# Patient Record
Sex: FEMALE | Race: WHITE | NOT HISPANIC OR LATINO | Employment: FULL TIME | URBAN - METROPOLITAN AREA
[De-identification: names, ages, dates, MRNs, and addresses within clinical notes are randomized per-mention and may not be internally consistent; named-entity substitution may affect disease eponyms.]

---

## 2017-10-21 ENCOUNTER — HOSPITAL ENCOUNTER (EMERGENCY)
Facility: CLINIC | Age: 42
Discharge: HOME | End: 2017-10-21
Attending: EMERGENCY MEDICINE | Admitting: EMERGENCY MEDICINE
Payer: COMMERCIAL

## 2017-10-21 VITALS
OXYGEN SATURATION: 98 % | TEMPERATURE: 98.8 F | RESPIRATION RATE: 14 BRPM | WEIGHT: 187 LBS | DIASTOLIC BLOOD PRESSURE: 89 MMHG | SYSTOLIC BLOOD PRESSURE: 137 MMHG | HEIGHT: 66 IN | BODY MASS INDEX: 30.05 KG/M2

## 2017-10-21 DIAGNOSIS — F41.9 ANXIETY: ICD-10-CM

## 2017-10-21 PROCEDURE — 99283 EMERGENCY DEPT VISIT LOW MDM: CPT

## 2017-10-21 PROCEDURE — 25000132 ZZH RX MED GY IP 250 OP 250 PS 637: Performed by: EMERGENCY MEDICINE

## 2017-10-21 RX ORDER — LORAZEPAM 2 MG/ML
0.5 INJECTION INTRAMUSCULAR ONCE
Status: DISCONTINUED | OUTPATIENT
Start: 2017-10-21 | End: 2017-10-21

## 2017-10-21 RX ORDER — LORAZEPAM 0.5 MG/1
1 TABLET ORAL ONCE
Status: COMPLETED | OUTPATIENT
Start: 2017-10-21 | End: 2017-10-21

## 2017-10-21 RX ADMIN — LORAZEPAM 1 MG: 0.5 TABLET ORAL at 03:48

## 2017-10-21 ASSESSMENT — ENCOUNTER SYMPTOMS
NUMBNESS: 1
SHORTNESS OF BREATH: 0
PALPITATIONS: 1
NERVOUS/ANXIOUS: 1

## 2017-10-21 NOTE — DISCHARGE INSTRUCTIONS

## 2017-10-21 NOTE — ED PROVIDER NOTES
"  History     Chief Complaint:  Panic Attack    HPI   Any Jackson is a 42 year old female who presents to the emergency department for evaluation of a possible panic attack. Of note, the patient states that she was seen in clinic for a routine visit earlier this month and was noted to have slightly elevated blood pressure without diagnosis of HTN. This elevated blood pressure has been a source of great anxiety for the patient and prompted her to again be seen in clinic on 10/16 for her anxiety, at which time she was given a prescription for Escitalopram. This evening, the patient states that she was resting comfortably watching TV, though began thinking about her blood pressure and \"had a panic attack,\" explaining that she felt very anxious, along with racing heart rate and generalized extremity tingling, at approximately 0030. This prompted her ED visit today. The patient has not had an episode of these symptoms in the past. She denies any recent chest pain or shortness of breath during this episode.     Allergies:  NKDA    Medications:    Lexapro  Celexa    Past Medical History:    Anemia  DM    Past Surgical History:    The patient does not have any pertinent past surgical history.    Family History:    No past pertinent family history.    Social History:  Presents with her .   Former Smoker.   Positive for alcohol use.   Marital Status:   [2]    Review of Systems   Respiratory: Negative for shortness of breath.    Cardiovascular: Positive for palpitations. Negative for chest pain.   Neurological: Positive for numbness (Tingling).   Psychiatric/Behavioral: The patient is nervous/anxious.    All other systems reviewed and are negative.    Physical Exam   First Vitals:  BP: (!) 175/96  Heart Rate: 121  Temp: 98.8  F (37.1  C)  Resp: 20  Height: 167.6 cm (5' 6\")  Weight: 84.8 kg (187 lb)  SpO2: 98 %    Physical Exam  General/Appearance: appears stated age, well-groomed, appears comfortable but " anxious and tearful  Eyes: EOMI, no scleral injection, no icterus  ENT: MMM  Neck: supple, nl ROM, no stiffness  Cardiovascular: tachy but regular, nl S1S2, no m/r/g, 2+ pulses in all 4 extremities, cap refill <2sec  Respiratory: CTAB, good air movement throughout, no wheezes/rhonchi/rales, no increased WOB, no retractions  Back: no lesions  GI: abd soft, non-distended, nttp,  no HSM, no rebound, no guarding, nl BS  MSK: WILSON, good tone, no bony abnormality  Skin: warm and well-perfused, no rash, no edema, no ecchymosis, nl turgor  Neuro: GCS 15, alert and oriented, no gross focal neuro deficits  Psych: anxious and tearful, good eye contact, no SI/HI  Heme: no petechia, no purpura, no active bleeding        Emergency Department Course   Interventions:  0348 Lorazepam, 1 mg, PO    Emergency Department Course:  Nursing notes and vitals reviewed. 0330 I performed an exam of the patient as documented above.     0430 I rechecked the patient. She is feeling improved after the above interventions.     Findings and plan explained to the Patient. Patient discharged home with instructions regarding supportive care, medications, and reasons to return. The importance of close follow-up was reviewed.     Impression & Plan    Medical Decision Making:  This patient is a 42-year-old female who presents with generalized symptoms very consistent with an anxiety reaction. There were no concerning symptoms such as chest pain, shortness of breath, presyncope, palpitations. Her blood pressure is slightly elevated here however she is tearful and anxious and therefore appropriate for situation. She feels better after Ativan. She recently was already started on Escitalopram by her PCP. She'll continue to follow up with them as needed.  Diagnosis:    ICD-10-CM   1. Anxiety F41.9       Disposition:  discharged to home  Daniel DURAN am serving as a scribe on 10/21/2017 at 3:30 AM to personally document services performed by Mehreen  Dottie PEREZ based on my observations and the provider's statements to me.       Daniel Mcgarry  10/21/2017    EMERGENCY DEPARTMENT       Dottie Verde MD  10/21/17 0652

## 2017-10-21 NOTE — ED AVS SNAPSHOT
Emergency Department    [address] [facility] [address]    Phone:  [phone]    Fax:  [phone]                                       Any Jackson   MRN: [id number]    Department:   Emergency Department   Date of Visit:  10/21/2017           Patient Information     Date Of Birth          1975        Your diagnoses for this visit were:     Anxiety        You were seen by Dottie Verde MD.      Follow-up Information     Follow up with Berlin Ontiveros MD.    Specialty:  OB/Gyn    Why:  As needed, If symptoms worsen    Contact information:    XXX RESIGNED XXX  [address]  [facility] [id number] [phone]          Follow up with  Emergency Department.    Specialty:  EMERGENCY MEDICINE    Why:  As needed    Contact information:    [address] [facility] [address] [phone]        Discharge Instructions         Anxiety Reaction  Anxiety is the feeling we all get when we think something bad might happen. It is a normal response to stress and usually causes only a mild reaction. When anxiety becomes more severe, it can interfere with daily life. In some cases, you may not even be aware of what it is you re anxious about. There may also be a genetic link or it may be a learned behavior in the home.  Both psychological and physical triggers cause stress reaction. It's often a response to fear or emotional stress, real or imagined. This stress may come from home, family, work, or social relationships.  During an anxiety reaction, you may feel:    Helpless    Nervous    Depressed    Irritable  Your body may show signs of anxiety in many ways. You may experience:    Dry mouth    Shakiness    Dizziness    Weakness    Trouble breathing    Breathing fast (hyperventilating)    Chest pressure    Sweating    Headache    Nausea    Diarrhea    Tiredness    Inability to sleep    Sexual problems  Home care    Try to locate the sources of stress in your life. They  may not be obvious. These may include:    Daily hassles of life (traffic jams, missed appointments, car troubles, etc.)    Major life changes, both good (new baby, job promotion) and bad (loss of job, loss of loved one)    Overload: feeling that you have too many responsibilities and can't take care of all of them at once    Feeling helpless, feeling that your problems are beyond what you re able to solve    Notice how your body reacts to stress. Learn to listen to your body signals. This will help you take action before the stress becomes severe.    When you can, do something about the source of your stress. (Avoid hassles, limit the amount of change that happens in your life at one time and take a break when you feel overloaded).    Unfortunately, many stressful situations can't be avoided. It is necessary to learn how to better manage stress. There are many proven methods that will reduce your anxiety. These include simple things like exercise, good nutrition and adequate rest. Also, there are certain techniques that are helpful:    Relaxation    Breathing exercises    Visualization    Biofeedback    Meditation  For more information about this, consult your doctor or go to a local bookstore and review the many books and tapes available on this subject.  Follow-up care  If you feel that your anxiety is not responding to self-help measures, contact your doctor or make an appointment with a counselor. You may need short-term psychological counseling and temporary medicine to help you manage stress.  Call 911  Call your healthcare provider right away if any of these occur:    Trouble breathing    Confusion    Drowsiness or trouble wakening    Fainting or loss of consciousness    Rapid heart rate    Seizure    New chest pain that becomes more severe, lasts longer, or spreads into your shoulder, arm, neck, jaw, or back  When to seek medical advice  Call your healthcare provider right away if any of these occur:    Your  symptoms get worse    Severe headache not relieved by rest and mild pain reliever  Date Last Reviewed: 9/29/2015 2000-2017 [employer]. [address] [place] [address], [place] [address]. All rights reserved. This information is not intended as a substitute for professional medical care. Always follow your healthcare professional's instructions.          24 Hour Appointment Hotline       To make an appointment at any [facility], call [phone] ([phone]). If you don't have a family doctor or clinic, we will help you find one. [facility] are conveniently located to serve the needs of you and your family.             Review of your medicines      Our records show that you are taking the medicines listed below. If these are incorrect, please call your family doctor or clinic.        Dose / Directions Last dose taken    CALCIUM PO   Dose:  1 tablet        Take 1 tablet by mouth daily.   Refills:  0        ESCITALOPRAM OXALATE PO   Dose:  10 mg        Take 10 mg by mouth   Refills:  0        ibuprofen 400-800 mg tablet   Commonly known as:  ADVIL,MOTRIN   Dose:  400-800 mg   Quantity:  90 tablet        Take 1-2 tablets by mouth every 6 hours as needed (cramping).   Refills:  0        PRENATAL VITAMINS PO   Dose:  1 tablet        Take 1 tablet by mouth daily.   Refills:  0        senna-docusate 8.6-50 MG per tablet   Commonly known as:  SENOKOT-S;PERICOLACE   Dose:  1-2 tablet   Quantity:  60 tablet        Take 1-2 tablets by mouth 2 times daily as needed for constipation.   Refills:  0                Orders Needing Specimen Collection     None      Pending Results     No orders found from 10/19/2017 to 10/22/2017.            Pending Culture Results     No orders found from 10/19/2017 to 10/22/2017.            Pending Results Instructions     If you had any lab results that were not finalized at the time of your Discharge, you can call the ED Lab Result RN at [phone]. You will be  contacted by this team for any positive Lab results or changes in treatment. The nurses are available 7 days a week from 10A to 6:30P.  You can leave a message 24 hours per day and they will return your call.        Test Results From Your Hospital Stay               Clinical Quality Measure: Blood Pressure Screening     Your blood pressure was checked while you were in the emergency department today. The last reading we obtained was  BP: 132/79 . Please read the guidelines below about what these numbers mean and what you should do about them.  If your systolic blood pressure (the top number) is less than 120 and your diastolic blood pressure (the bottom number) is less than 80, then your blood pressure is normal. There is nothing more that you need to do about it.  If your systolic blood pressure (the top number) is 120-139 or your diastolic blood pressure (the bottom number) is 80-89, your blood pressure may be higher than it should be. You should have your blood pressure rechecked within a year by a primary care provider.  If your systolic blood pressure (the top number) is 140 or greater or your diastolic blood pressure (the bottom number) is 90 or greater, you may have high blood pressure. High blood pressure is treatable, but if left untreated over time it can put you at risk for heart attack, stroke, or kidney failure. You should have your blood pressure rechecked by a primary care provider within the next 4 weeks.  If your provider in the emergency department today gave you specific instructions to follow-up with your doctor or provider even sooner than that, you should follow that instruction and not wait for up to 4 weeks for your follow-up visit.        Thank you for choosing [facility]       Thank you for choosing [facility] for your care. Our goal is always to provide you with excellent care. Hearing back from our patients is one way we can continue to improve our services. Please take a few minutes to  "complete the written survey that you may receive in the mail after you visit with us. Thank you!        [email or web address] Information     [employer] lets you send messages to your doctor, view your test results, renew your prescriptions, schedule appointments and more. To sign up, go to [email or web address] . Click on \"Log in\" on the left side of the screen, which will take you to the Welcome page. Then click on \"Sign up Now\" on the right side of the page.     You will be asked to enter the access code listed below, as well as some personal information. Please follow the directions to create your username and password.     Your access code is: [email or web address]  Expires: 2018  4:43 AM     Your access code will  in 90 days. If you need help or a new code, please call your [facility] clinic or [phone].        Care EveryWhere ID     This is your Care EveryWhere ID. This could be used by other organizations to access your [facility] medical records  [id number]        Equal Access to Services     [facility] Hospital[facility] : Hadii ismael mourao Sodexter, waaxda luqadaha, qaybta kaalmarebel escobar, basil holm . So [facility] [phone].    ATENCIÓN: Si habla español, tiene a emerosn disposición servicios gratuitos de asistencia lingüística. Llame al [phone].    We comply with applicable federal civil rights laws and [place] laws. We do not discriminate on the basis of race, color, national origin, age, disability, sex, sexual orientation, or gender identity.            After Visit Summary       This is your record. Keep this with you and show to your community pharmacist(s) and doctor(s) at your next visit.                  "

## 2017-10-21 NOTE — ED AVS SNAPSHOT
Emergency Department    [address] [facility] [address]    Phone:  [phone]    Fax:  [phone]                                       Any Jackson   MRN: [id number]    Department:   Emergency Department   Date of Visit:  10/21/2017           After Visit Summary Signature Page     I have received my discharge instructions, and my questions have been answered. I have discussed any challenges I see with this plan with the nurse or doctor.    ..........................................................................................................................................  Patient/Patient Representative Signature      ..........................................................................................................................................  Patient Representative Print Name and Relationship to Patient    ..................................................               ................................................  Date                                            Time    ..........................................................................................................................................  Reviewed by Signature/Title    ...................................................              ..............................................  Date                                                            Time

## 2017-10-25 ENCOUNTER — CARE COORDINATION (OUTPATIENT)
Dept: CARE COORDINATION | Facility: CLINIC | Age: 42
End: 2017-10-25

## 2017-10-25 NOTE — PROGRESS NOTES
Clinic Care Coordination Contact  [facility]/Voicemail    Referral Source: ED Follow-Up  Clinical Data: Care Coordinator Outreach Patient went to ED for panic attack.  She has increased symptoms of anxiety due to recent change in her health, high blood pressure.    Outreach attempted x 1.  Left message on voicemail with call back information and requested return call.  Plan: Care Coordinator will try to reach patient again in 1-2 business days.  Olga Perez,   [facility]  [email or web address]  [phone]

## 2017-10-25 NOTE — LETTER
[facility]  [address]  [place] [address]  [phone]      October 30, 2017      Any Jackson  [address]  [facility] [address]    Dear Any,  I am the Clinic Care Coordinator that works with your primary care provider's clinic. I wanted to introduce myself and provide you with my contact information for you to be able to call me with any questions or concerns. Below is a description of what Clinic Care Coordination is and how I can further assist you.      The Clinic Care Coordinator role is a Registered Nurse and/or  who understands the health care system. The goal of Clinic Care Coordination is to help you manage your health and improve access to the [facility] system in the most efficient manner.  The Registered Nurse can assist you in meeting your health care goals by providing education, coordinating services, and strengthening the communication among your providers. The  can assist you with financial, behavioral, psychosocial, and chemical dependency and counseling/psychiatric resources.    Please feel free to keep this letter and contact information to contact me at [phone] with any further questions or concerns that may arise. We at [facility] are focused on providing you with the highest-quality healthcare experience possible and that all starts with you.       Sincerely,     SHAN Wheeler  [phone]      Enclosed: I have enclosed helpful educational material. Please review and call me with any questions.

## 2017-10-30 NOTE — PROGRESS NOTES
Clinic Care Coordination Contact  [facility]/Voicemail    Referral Source: ED Follow-Up  Clinical Data: Care Coordinator Outreach  Outreach attempted x 2.  Left message on voicemail with call back information and requested return call.  Plan: Care Coordinator will mail out care coordination introduction letter with care coordinator contact information and explanation of care coordination services. Care Coordinator will do no further outreaches at this time.  Olag Perez,   [facility]  [email or web address]  [phone]

## 2020-06-10 ENCOUNTER — HOSPITAL ENCOUNTER (EMERGENCY)
Facility: CLINIC | Age: 45
Discharge: HOME | End: 2020-06-10
Attending: NURSE PRACTITIONER | Admitting: NURSE PRACTITIONER
Payer: COMMERCIAL

## 2020-06-10 VITALS
HEIGHT: 67 IN | SYSTOLIC BLOOD PRESSURE: 165 MMHG | WEIGHT: 198 LBS | OXYGEN SATURATION: 95 % | TEMPERATURE: 98.3 F | DIASTOLIC BLOOD PRESSURE: 112 MMHG | BODY MASS INDEX: 31.08 KG/M2 | RESPIRATION RATE: 18 BRPM

## 2020-06-10 DIAGNOSIS — Z86.69 HX OF MIGRAINE WITH AURA: ICD-10-CM

## 2020-06-10 DIAGNOSIS — F41.9 ANXIETY: ICD-10-CM

## 2020-06-10 PROCEDURE — 99283 EMERGENCY DEPT VISIT LOW MDM: CPT

## 2020-06-10 PROCEDURE — 25000132 ZZH RX MED GY IP 250 OP 250 PS 637: Performed by: NURSE PRACTITIONER

## 2020-06-10 RX ORDER — LORAZEPAM 0.5 MG/1
1 TABLET ORAL ONCE
Status: COMPLETED | OUTPATIENT
Start: 2020-06-10 | End: 2020-06-10

## 2020-06-10 RX ADMIN — LORAZEPAM 1 MG: 0.5 TABLET ORAL at 16:26

## 2020-06-10 ASSESSMENT — ENCOUNTER SYMPTOMS: NERVOUS/ANXIOUS: 1

## 2020-06-10 ASSESSMENT — MIFFLIN-ST. JEOR: SCORE: 1590.75

## 2020-06-10 NOTE — ED PROVIDER NOTES
History     Chief Complaint:  Eye Problem    HPI   Any Jackson is a 44 year old female who presents to the emergency department today for evaluation of an eye problem. The patient reports that she infrequently experiences migraines consisting of central head pressure, a visual aura, and paresthesias of the finger tips, adding that she typically treats her symptoms with over the counter medications. She explains that she developed similar symptoms on 6/28/2020 that resolved with over the counter tylenol with caffeine. The patient furthers that her visual aura and paresthesias to her fingertips returned today. This resulted in an increase in her baseline anxiety out of fear of more serious etiologies and prompted a call to her primary care provider who ultimately recommended ER presentation. Currently the patient denies any head pressure, visual symptoms, and paresthesias. Of note, the patient is intermittently tearful because she feels embarrassed that she is here and feeling anxious.     Allergies:  No Known Drug Allergies      Medications:    Escitalopram oxalate  Senokot S    Past Medical History:    Gestational diabetes  Anemia  Diabetes mellitus   Anxiety  Migraines    Past Surgical History:    Surgical history reviewed. No pertinent surgical history.    Family History:    Family history reviewed. No pertinent family history.    Social History:  The patient presents to the ED alone.  Smoking Status: Former Smoker   Years: 10  Alcohol Use: Positive  Drug Use: Negative  PCP: Nely Murphy  Marital Status:        Review of Systems   HENT:        Head pressure   Eyes:        Aura    Neurological:        Paresthesias   Psychiatric/Behavioral: The patient is nervous/anxious.    All other systems reviewed and are negative.      Physical Exam     Patient Vitals for the past 24 hrs:   BP Temp Temp src Heart Rate Resp SpO2 Height Weight   06/10/20 1540 (!) 193/113 98.3  F (36.8  C) Oral 109 18  "95 % 1.702 m (5' 7\") 89.8 kg (198 lb)     Physical Exam  Constitutional:  Intermittently tearful. Mildly anxious appearing.   Head: Head moves freely with normal range of motion.   ENT: Oropharynx is clear and moist.   Eyes: Conjunctivae pink. EOMs intact. PERRL.No horizontal or vertical nystagmus.   Neck: Normal range of motion. No nuchal rigidity.   Cardiovascular: Regular rate and rhythm. Normal heart sounds. No concerning murmur. Intact distal pulses: radial pulses 2+ on the right, 2+ on the left.   Pulmonary/Chest: No respiratory distress. No decreased breath sounds. Lungs clear throughout.   Abdominal: Soft. Non-tender. No rebound, no guarding.   Musculoskeletal: No peripheral edema. Distal capillary refill and sensation intact.   Neurological: Oriented to person, place, and time. No focal deficits. CN II-XII intact. No facial asymmetry. No dysarthria. No trunchal instability. Upper and lower extremity strength is 5/5 and equal bilaterally. Ambulation is normal.  Skin: Skin is warm and normal in color. No diaphoresis. No rash noted.      Emergency Department Course     Interventions:  1626 Ativan 1 mg Oral    Emergency Department Course:    1601 Nursing notes and vitals reviewed. I performed an exam of the patient as documented above.     Findings and plan explained to the patient. Patient discharged home with instructions regarding supportive care, medications, and reasons to return. The importance of close follow-up was reviewed.    Impression & Plan      Medical Decision Making:  Any Jackson is a 44 year old female who presents to the emergency department today for evaluation of a migraine headache with aura which has resolved prior to arrival. She also had some paraesthesias of her fingertips that has also since resolved. She is feeling anxious and notes hx of anxiety and has had panic attacks. She appears anxious and is intermittently tearful, she is also anxious about chalino here with concerns of " COVID exposure. Neuro exam is normal. I have no concerns for central infection, CVA or ICH. Ativan po given and she feels improved and would like to discharge home. We discussed reasons to return here. She is amenable to plan.     Diagnosis:    ICD-10-CM    1. Anxiety  F41.9    2. Hx of migraine with aura  Z86.69      Disposition:   The patient is discharged to home.    Scribe Disclosure:  I, Rhoda Damian, am serving as a scribe at 4:13 PM on 6/10/2020 to document services personally performed by Tierra Bruner CNP based on my observations and the provider's statements to me.      EMERGENCY DEPARTMENT       Tierra Bruner APRN CNP  06/10/20 2035

## 2020-06-10 NOTE — ED AVS SNAPSHOT
Emergency Department  [address] [facility] [address]  Phone:  [phone]  Fax:  [phone]                                    Any Jackson   MRN: [id number]    Department:   Emergency Department   Date of Visit:  6/10/2020           After Visit Summary Signature Page    I have received my discharge instructions, and my questions have been answered. I have discussed any challenges I see with this plan with the nurse or doctor.    ..........................................................................................................................................  Patient/Patient Representative Signature      ..........................................................................................................................................  Patient Representative Print Name and Relationship to Patient    ..................................................               ................................................  Date                                   Time    ..........................................................................................................................................  Reviewed by Signature/Title    ...................................................              ..............................................  Date                                               Time          22EPIC Rev 08/18

## 2021-04-29 ENCOUNTER — TRANSFERRED RECORDS (OUTPATIENT)
Dept: HEALTH INFORMATION MANAGEMENT | Facility: CLINIC | Age: 46
End: 2021-04-29

## 2021-04-29 LAB — HBA1C MFR BLD: 11.8 % (ref 4.8–4.6)

## 2021-04-29 ASSESSMENT — MIFFLIN-ST. JEOR: SCORE: 1542.66

## 2021-05-05 ENCOUNTER — OFFICE VISIT (OUTPATIENT)
Dept: PHARMACY | Facility: PHYSICIAN GROUP | Age: 46
End: 2021-05-05
Payer: COMMERCIAL

## 2021-05-05 DIAGNOSIS — E11.65 TYPE 2 DIABETES MELLITUS WITH HYPERGLYCEMIA, UNSPECIFIED WHETHER LONG TERM INSULIN USE (H): Primary | ICD-10-CM

## 2021-05-05 DIAGNOSIS — Z78.9 TAKES DIETARY SUPPLEMENTS: ICD-10-CM

## 2021-05-05 DIAGNOSIS — E78.5 HYPERLIPIDEMIA LDL GOAL <100: ICD-10-CM

## 2021-05-05 DIAGNOSIS — F41.9 ANXIETY: ICD-10-CM

## 2021-05-05 PROCEDURE — 99605 MTMS BY PHARM NP 15 MIN: CPT | Performed by: PHARMACIST

## 2021-05-05 PROCEDURE — 99607 MTMS BY PHARM ADDL 15 MIN: CPT | Performed by: PHARMACIST

## 2021-05-05 RX ORDER — METFORMIN HYDROCHLORIDE EXTENDED-RELEASE TABLETS 500 MG/1
1 TABLET, FILM COATED, EXTENDED RELEASE ORAL DAILY
COMMUNITY
Start: 2021-05-04 | End: 2021-05-07

## 2021-05-05 RX ORDER — ESCITALOPRAM OXALATE 10 MG/1
1 TABLET ORAL DAILY
COMMUNITY
Start: 2021-05-03

## 2021-05-05 NOTE — PROGRESS NOTES
Medication Therapy Management (MTM) Encounter    ASSESSMENT:                            Medication Adherence/Access: See below for considerations    Type 2 Diabetes: Patient is not meeting A1c goal of < 7%. Patient would benefit from minimum SMBG: Check blood sugars fasting, and occasionally 2 hours after starting a meal, Metformin :  Change formulation to standard ER 500mg tabs for cheaper medication- titrate 500mg every 7 days up to 2000mg daily, Increased exercise and weight loss recommended. Diet basics discussed, but need ongoing discussion of implementation. Taught AccuChek Guide Me meter today based on formulary chart.     Anxiety: Overwhelmed  with new dx, ongoing support and monitoring recommended.     Screening for Hypertension: unable to assess today due to declination of BP check, but has been elevated in the past due to anxiety around office visits. May need to consider ambulatory monitoring - held off today due to patient's initial concerns of blood sugars.      Hyperlipidemia: Not adding additional therapy today based on patient's anxiety about change and diagnosis, but would consider at future follow up.     Supplements: stable.    PLAN:                            1. Start checking sugars twice daily- goal 80-130mg/dl fasting and <180mg/dl 2 hours after a meal. AccuChek Guide strips were not covered, but was able to process using savings card for $29.99 for 100 strips and this is cheaper than the Contour strips that insurance was preferring for her.     2. Metformin ER 500mg daily x7 days, then 2 tabs daily x 7 days, then 3 tabs daily x 7 days then 4 tabs daily. These can be taken together or split up. Take with food. - Switched from Osmotic tabs as these were $78 for #30 tabs under her insurance.      3. Increasing exercise    4. Diet discussion started- focusing on portions and carbohydrate choices. Whole grains, higher fiber carbohydrates preferred over high sugar.       Follow-up: Return in 1  week (on 5/12/2021) for Phone call with MTM.      SUBJECTIVE/OBJECTIVE:                          Any Jackson is a 45 year old female coming in for an initial visit. She was referred to me from .      Reason for visit: NEW diabetes diagnosis, had GDM x2 needing insulin.      Allergies/ADRs: Reviewed in chart  Tobacco: She reports that she has quit smoking. She quit after 10.00 years of use. She does not have any smokeless tobacco history on file.  Alcohol: not currently using  Past Medical History: Reviewed in chart - GDM X 2  Social: 2 kids, daughter with heart condition/     Medication Adherence/Access: no issues reported    Type 2 Diabetes:  Currently taking metforminOSM ER 500mg daily (rx with elevated a1c), however was ordered the Osmotic version so delay in getting script from pharmacy and so has not started taking it yet. Will start today.   Patient is not experiencing side effects.  Blood sugar monitoring: BG meter taught today. Ranges (patient reported): 413mg/dl in office today, off meds, 1 hour after lunch  Symptoms of low blood sugar? none  Symptoms of high blood sugar? vision changes  Eye exam: up to date  Diet/Exercise: discussion today around carbohydrate/sugar basics to start, will dive more into goals and targets for carbohydrates at future visit when more time is available.   Aspirin: Not taking due to age  Statin: No   ACEi/ARB: No.   A1c 11.8%     Anxiety: Taking escitalopram 10mg daily, has been helpful without side effects. Worries about heart and blood pressure. Wants to be healthy for her kids.     Screening for Hypertension: Current medications include nothing- anxiety around blood pressure checks so declines check today.     Hyperlipidemia: Current therapy includes no current medications.    The ASCVD Risk score ([place] JAY Jr., et al., 2013)~ 4.3%  Date: 4/29/2021  Total Cholesterol: 231mg/dl  Triglycerides: 257mg/dl  HDL: 29mg/dl  LDL: 154mg/dl    Supplements: MV daily and  "Vitamin C daily. No issues at this time.     Today's Vitals: BP (!) 140/85   Pulse 85   Ht 5' 6\" (1.676 m)   Wt 192 lb (87.1 kg)   BMI 30.99 kg/m    ----------------      I spent 60 minutes with this patient today. All changes were made via collaborative practice agreement with Dr. Murphy. A copy of the visit note was provided to the patient's primary care provider.    The patient was given a summary of these recommendations.     Nia Park, Pharm.D, [facility]  Medication Therapy Management Pharmacist  [phone]        Medication Therapy Recommendations  Type 2 diabetes mellitus with hyperglycemia, unspecified whether long term insulin use (H)    Current Medication: Blood Glucose Monitoring Suppl (ACCU-CHEK GUIDE ME) w/Device KIT   Rationale: Medication requires monitoring - Needs additional monitoring - Effectiveness   Recommendation: Self-Monitoring - Accu-Chek Guide Me w/Device Kit - start checking sugarss   Status: Accepted per CPA          Current Medication: metFORMIN (GLUCOPHAGE-XR) 500 MG 24 hr tablet   Rationale: Cannot afford medication product - Cost - Adherence   Recommendation: Change Medication - metFORMIN 500 MG 24 hr tablet - change from osmotic to ER   Status: Accepted per CPA               "

## 2021-05-07 VITALS
BODY MASS INDEX: 30.86 KG/M2 | HEART RATE: 85 BPM | WEIGHT: 192 LBS | DIASTOLIC BLOOD PRESSURE: 85 MMHG | HEIGHT: 66 IN | SYSTOLIC BLOOD PRESSURE: 140 MMHG

## 2021-05-07 RX ORDER — BLOOD-GLUCOSE METER
EACH MISCELLANEOUS
COMMUNITY
End: 2024-05-10

## 2021-05-07 RX ORDER — METFORMIN HCL 500 MG
1000 TABLET, EXTENDED RELEASE 24 HR ORAL 2 TIMES DAILY WITH MEALS
COMMUNITY

## 2021-05-07 RX ORDER — MULTIVIT WITH MINERALS/LUTEIN
250 TABLET ORAL DAILY
COMMUNITY
End: 2024-05-10

## 2021-05-07 NOTE — PATIENT INSTRUCTIONS
Recommendations from today's MTM visit:                                                       1. Start checking sugars twice daily- goal 80-130mg/dl fasting and <180mg/dl 2 hours after a meal.     2. Metformin ER 500mg daily x7 days, then 2 tabs daily x 7 days, then 3 tabs daily x 7 days then 4 tabs daily. These can be taken together or split up. Take with food.     3. Increasing exercise    4. Diet discussion started- focusing on portions and carbohydrate choices. Whole grains, higher fiber carbohydrates preferred over high sugar.       Follow-up: No follow-ups on file.    It was great to speak with you today.  I value your experience and would be very thankful for your time with providing feedback on our clinic survey. You may receive a survey via email or text message in the next few days.     To schedule another MTM appointment, please call the clinic directly or you may call the MTM scheduling line at [phone] or toll-free at [phone].     My Clinical Pharmacist's contact information:                                                      Please feel free to contact me with any questions or concerns you have.      Nia Park, Pharm.D, [facility]  Medication Therapy Management Pharmacist  [phone]

## 2021-05-12 ENCOUNTER — VIRTUAL VISIT (OUTPATIENT)
Dept: PHARMACY | Facility: PHYSICIAN GROUP | Age: 46
End: 2021-05-12
Payer: COMMERCIAL

## 2021-05-12 DIAGNOSIS — E11.65 TYPE 2 DIABETES MELLITUS WITH HYPERGLYCEMIA, UNSPECIFIED WHETHER LONG TERM INSULIN USE (H): Primary | ICD-10-CM

## 2021-05-12 PROCEDURE — 99606 MTMS BY PHARM EST 15 MIN: CPT | Performed by: PHARMACIST

## 2021-05-12 NOTE — PROGRESS NOTES
Medication Therapy Management (MTM) Encounter    ASSESSMENT:                            Medication Adherence/Access: No issues identified    Type 2 Diabetes: ongoing discussion on diet and progress, sugars are improving and still has room for titration on metformin. Referral to CDE is recommended. Additional medication to be considered after titration of insulin to 2000mg per day is met.       PLAN:                            1. Referral to diabetes education for nutritional education.     2. Continue titration of metformin  Up to 4 per day (2000mg)    Follow-up: 3:00 phone call on 5/28    SUBJECTIVE/OBJECTIVE:                          Any Jackson is a 45 year old female called for a follow-up visit. She was referred to me from Dr. Murphy.  Today's visit is a follow-up MTM visit from Dr. Murphy     Reason for visit: diabetes follow up, new dx last week.    Allergies/ADRs: Reviewed in chart  Tobacco: She reports that she has quit smoking. She quit after 10.00 years of use. She does not have any smokeless tobacco history on file.  Alcohol: not currently using  Past Medical History: Reviewed in chart - GDM X 2  Social: 2 kids, daughter with heart condition/     Medication Adherence/Access: no issues reported    Type 2 Diabetes:  Currently taking metformin ER 500mg daily - 1 week at 1 daily, now going to 2 daily for 7 days, (rx with elevated a1c), however was ordered the Osmotic version so delay in getting script from pharmacy and so has not started taking it yet. Will start today.   Patient is not experiencing side effects.  Blood sugar monitoring: BG meter taught today. Ranges (patient reported): 413mg/dl in office today, off meds, 1 hour after lunch  Symptoms of low blood sugar? none  Symptoms of high blood sugar? vision changes  Eye exam: up to date  Diet/Exercise: discussion today around carbohydrate/sugar basics to start, will dive more into goals and targets for carbohydrates at future visit when more  time is available.   Aspirin: Not taking due to age  Statin: No   ACEi/ARB: No.   A1c 11.8%     Date FBG Lunch Dinner /2hours post    5/12 274      5/11 296  379/257    5/10 303  *missed/298    5/9 308  235/317    5/8 271  Forgot meter    5/7 310  289/300    5/6 297  333/346    5/5  413 288/368        Today's Vitals: There were no vitals taken for this visit.  ----------------    I spent 10 minutes with this patient today. All changes were made via collaborative practice agreement with . A copy of the visit note was provided to the patient's primary care provider.    The patient was given a summary of these recommendations.     Nia Park, Pharm.D, [facility]  Medication Therapy Management Pharmacist  [phone]      Telemedicine Visit Details  Type of service:  Telephone visit  Start Time: 3:31 PM  End Time: 3:43 PM  Originating Location (patient location): Home  Distant Location (provider location):  [facility] MTM      Medication Therapy Recommendations  No medication therapy recommendations to display

## 2021-05-17 NOTE — PATIENT INSTRUCTIONS
Recommendations from today's MTM visit:                                                    1. Referral to diabetes education for nutritional education.     2. Continue titration of metformin  Up to 4 tablets per day (2000mg)    Follow-up: 3:00 phone call on 5/28    It was great to speak with you today.  I value your experience and would be very thankful for your time with providing feedback on our clinic survey. You may receive a survey via email or text message in the next few days.     To schedule another MTM appointment, please call the clinic directly or you may call the MTM scheduling line at [phone] or toll-free at [phone].     My Clinical Pharmacist's contact information:                                                      Please feel free to contact me with any questions or concerns you have.      Nia Park, Pharm.D, [facility]  Medication Therapy Management Pharmacist  [phone]

## 2021-05-28 ENCOUNTER — VIRTUAL VISIT (OUTPATIENT)
Dept: PHARMACY | Facility: PHYSICIAN GROUP | Age: 46
End: 2021-05-28
Payer: COMMERCIAL

## 2021-05-28 DIAGNOSIS — E11.65 TYPE 2 DIABETES MELLITUS WITH HYPERGLYCEMIA, UNSPECIFIED WHETHER LONG TERM INSULIN USE (H): Primary | ICD-10-CM

## 2021-05-28 PROCEDURE — 99606 MTMS BY PHARM EST 15 MIN: CPT | Performed by: PHARMACIST

## 2021-05-28 NOTE — PROGRESS NOTES
Medication Therapy Management (MTM) Encounter    ASSESSMENT:                            Medication Adherence/Access: No issues identified    Type 2 Diabetes: Patient is not meeting A1c goal of < 7%. Self monitoring of blood glucose is not at goal of fasting  mg/dL and post prandial < 150 mg/dL consistently yet, but has seen great improvement in short time. Patient would benefit from splitting metformin twice daily for better tolerability and take with food. From there will determine if additional medication is needed.       PLAN:                            1. Split metformin into two times per day- 2 tablets in the morning and 2 tablets in the evening with food.     Follow-up: Return in 1 week (on 6/4/2021) for Phone call with MTM.      SUBJECTIVE/OBJECTIVE:                          Any Jackson is a 45 year old female called for a follow-up visit. She was referred to me from Dr. Murphy.  Today's visit is a follow-up MTM visit from 5/12     Reason for visit: diabetes follow up.    Allergies/ADRs: Reviewed in chart  Tobacco: She reports that she has quit smoking. She quit after 10.00 years of use. She does not have any smokeless tobacco history on file.  Alcohol: not currently using  Past Medical History: Reviewed in chart - GDM X 2  Social: 2 kids, daughter with heart condition/     Medication Adherence/Access: no issues reported    Type 2 Diabetes:  Currently taking metformin ER 500mg 4 daily once all together, but getting some nausea/loose stools that she is concerned about. Has been working up on this medication.   Home monitoring- fasting/ post dinner,  (7 day avg 178mg/dl)  128, 190  -, 149  -, 222  133, 189    Patient is not experiencing side effects.  Blood sugar monitoring: BG meter taught today. Ranges (patient reported): 413mg/dl in office today, off meds, 1 hour after lunch  Symptoms of low blood sugar? none  Symptoms of high blood sugar? vision changes  Eye exam: up to  date  Diet/Exercise: discussion today around carbohydrate/sugar basics to start, will dive more into goals and targets for carbohydrates at future visit when more time is available.   Aspirin: Not taking due to age  Statin: No   ACEi/ARB: No.   A1c 11.8% on 4/29      Today's Vitals: There were no vitals taken for this visit.  ----------------      I spent 10 minutes with this patient today. All changes were made via collaborative practice agreement with . A copy of the visit note was provided to the patient's primary care provider.    The patient declined a summary of these recommendations.     Nia Park, Pharm.D, [facility]  Medication Therapy Management Pharmacist  [phone]      Telemedicine Visit Details  Type of service:  Telephone visit  Start Time: 3:18 PM  End Time: 3:28 PM  Originating Location (patient location): [place]  Distant Location (provider location):  [facility] M[facility]        Medication Therapy Recommendations  Type 2 diabetes mellitus with hyperglycemia, unspecified whether long term insulin use (H)    Current Medication: metFORMIN (GLUCOPHAGE-XR) 500 MG 24 hr tablet   Rationale: Undesirable effect - Adverse medication event - Safety   Recommendation: Change Medication - metFORMIN 500 MG 24 hr tablet - split to twice daily with food   Status: Accepted per CPA

## 2021-06-04 ENCOUNTER — VIRTUAL VISIT (OUTPATIENT)
Dept: EDUCATION SERVICES | Facility: OTHER | Age: 46
End: 2021-06-04
Payer: COMMERCIAL

## 2021-06-04 DIAGNOSIS — E11.65 TYPE 2 DIABETES MELLITUS WITH HYPERGLYCEMIA, UNSPECIFIED WHETHER LONG TERM INSULIN USE (H): ICD-10-CM

## 2021-06-04 PROCEDURE — G0108 DIAB MANAGE TRN  PER INDIV: HCPCS | Mod: 95 | Performed by: DIETITIAN, REGISTERED

## 2021-06-04 RX ORDER — LANCETS
EACH MISCELLANEOUS
Qty: 100 EACH | Refills: 3 | Status: SHIPPED | OUTPATIENT
Start: 2021-06-04 | End: 2024-05-10

## 2021-06-04 RX ORDER — BLOOD SUGAR DIAGNOSTIC
STRIP MISCELLANEOUS
Qty: 100 STRIP | Refills: 3 | Status: SHIPPED | OUTPATIENT
Start: 2021-06-04 | End: 2024-05-10

## 2021-06-04 NOTE — LETTER
"    6/4/2021         RE: Any Jackson  [address]  [place] [address]        Dear Colleague,    Thank you for referring your patient, Any Jackson, to the [facility]. Please see a copy of my visit note below.    Diabetes Self-Management Education & Support    Type of service:  Video Visit    If the video visit is dropped, the video visit invitation should be resent by: cell phone    Originating Location (pt. Location):  Work  Distant Location (provider location): Home  Mode of Communication:  Video Conference via 1-4 All        Video Start Time: 9:07 AM  Video End Time (time video stopped): 10:08 am    How would patient like to obtain AVS? MyChart       Presents for: Initial Assessment for new diagnosis    SUBJECTIVE/OBJECTIVE:  Presents for: Initial Assessment for new diagnosis  Accompanied by: Self  Diabetes education in the past 24mo: Yes(With [facility])  Focus of Visit: Other(New dx DM)  Diabetes type: Type 2  Date of diagnosis: 2021  Disease course: Improving  How confident are you filling out medical forms by yourself:: Extremely  Diabetes management related comments/concerns: New dx - pt did have Gestational with 2 children  Transportation concerns: No  Difficulty affording diabetes medication?: No  Difficulty affording diabetes testing supplies?: No  Other concerns:: None  Cultural Influences/Ethnic Background:  American    Diabetes Symptoms & Complications:  Visual change: Yes  Complications assessed today?: No    Patient Problem List and Family Medical History reviewed for relevant medical history, current medical status, and diabetes risk factors.    Vitals:  There were no vitals taken for this visit.  Estimated body mass index is 30.99 kg/m  as calculated from the following:    Height as of 4/29/21: 1.676 m (5' 6\").    Weight as of 4/29/21: 87.1 kg (192 lb).   Last 3 BP:   BP Readings from Last 3 Encounters:   04/29/21 (!) 140/85   06/10/20 (!) 165/112 "   10/21/17 137/89       History   Smoking Status     Former Smoker     Years: 10.00   Smokeless Tobacco     Not on file       Labs:  Lab Results   Component Value Date    A1C 11.8 04/29/2021     No results found for: GLC  No results found for: LDL  No results found for: HDL]  No results found for: GFRESTIMATED  No results found for: GFRESTBLACK  No results found for: CR  No results found for: MICROALBUMIN    Healthy Eating:  Healthy Eating Assessed Today: Yes  Cultural/Mandaeism diet restrictions?: Yes([place] makes me sick)  Meal planning/habits: Avoiding sweets, Low carb, Other, Carb counting(Says spent last year eating sweets and candy)  How many times a week on average do you eat food made away from home (restaurant/take-out)?: 2  Meals include: Breakfast, Lunch, Dinner  Breakfast: 7050-7173 English muffin and egg, sometimes with slice of cheese; PB and Jelly sometimes on there and egg; avocado on it.  Water.  Sometimes will have coffee  Lunch: 1200 Chicken salad, wheat thins, and cheese; half PB and Jelly sandwich, pretzel, yogurt and 1 indira cracker, diet coke  Dinner: 6411-5440 Chipotle - salad, beck lite; chicken breast, gauri salad, and 6 chocolate covered almonds; salad with grilled chx diet coke and rum/chicken spring rolls, dilly bar (picked bc lowest carb item)  Snacks: granola bar; 8 chocolate covered almonds; doesn't feel as hungry lately for snacks  Other: used a meal service, but got tired of it  Beverages: Diet soda, Water, Coffee, Alcohol, Tea  Has patient met with a dietitian in the past?: Yes(For gestational diabetes)    Being Active:  Being Active Assessed Today: Yes  Exercise:: Yes  Days per week of moderate to strenuous exercise (like a brisk walk): 3  On average, minutes per day of exercise at this level: 60  How intense was your typical exercise? : Moderate (like brisk walking)  Exercise Minutes per Week: 180  Barrier to exercise: None    Monitoring:  Monitoring Assessed Today: Yes  Did  patient bring glucose meter to appointment? : No  Blood Glucose Meter: Accu-chek  Times checking blood sugar at home (number): 3  Times checking blood sugar at home (per): Day  Blood glucose trend: Decreasing    Left meter at home - says numbers looking good.  112 fasting this morning and less than 120 after eating.  Mostly numbers under 140        Taking Medications:  Diabetes Medication(s)     Biguanides       metFORMIN (GLUCOPHAGE-XR) 500 MG 24 hr tablet    Take 1,000 mg by mouth 2 times daily (with meals)           Taking Medication Assessed Today: Yes  Current Treatments: Oral Medication (taken by mouth)  Problems taking diabetes medications regularly?: Yes  Diabetes medication side effects?: Yes(loose stools from metformin)    Problem Solving:  Problem Solving Assessed Today: Yes  Is the patient at risk for hypoglycemia?: No     Reducing Risks:  Diabetes Risks: History of gestational diabetes, Family History  Additional female risks: Gestational Diabetes  CAD Risks: Diabetes Mellitus, Family history  Has dilated eye exam at least once a year?: Yes  Sees dentist every 6 months?: No  Feet checked by healthcare provider in the last year?: No    Healthy Coping:  Emotional response to diabetes: Ready to learn, Acceptance  Informal Support system:: Family  Stage of change: PREPARATION (Decided to change - considering how)  Patient Activation Measure Survey Score:  No flowsheet data found.    Diabetes knowledge and skills assessment:   Patient is knowledgeable in diabetes management concepts related to: Monitoring and Taking Medication    Patient needs further education on the following diabetes management concepts: Healthy Eating and Monitoring    Based on learning assessment above, most appropriate setting for further diabetes education would be: Individual setting.      INTERVENTIONS:    Education provided today on:  AADE Self-Care Behaviors:  Healthy Eating: carbohydrate counting and label reading  Being Active:  relationship to blood glucose  Monitoring: log and interpret results, individual blood glucose targets and frequency of monitoring    Opportunities for ongoing education and support in diabetes-self management were discussed.    Pt verbalized understanding of concepts discussed and recommendations provided today.       Education Materials Provided:  [place] Understanding Diabetes Booklet, BG Log Sheet and Carbohydrate Counting, sharps disposal, and food log       ASSESSMENT:  Newly dx with diabetes.  On max dose metformin and blood sugars are improving.  Pt is experiencing loose stools on metformin, but is changing around how she takes this is medication per MT recommendations, and pt is hoping this will help.  Pt with hx of Gestational Diabetes, and familiar with carbohydrate counting, but re-introduced this today as well as guidelines for ETOH and diabetes, blood glucose targets, and physical activity guidelines.  Provide use of fiber supplement recommendations.  Will follow up with pt in about a month to continue education.        Patient's most recent   Lab Results   Component Value Date    A1C 11.8 04/29/2021    is not meeting goal of <7.0    PLAN  See Patient Instructions for co-developed, patient-stated behavior change goals.  AVS printed and provided to patient today. See Follow-Up section for recommended follow-up.    Time Spent: 61 minutes  Encounter Type: Individual    Any diabetes medication dose changes were made via the CDE Protocol and Collaborative Practice Agreement with the patient's referring provider. A copy of this encounter was shared with the provider.      Elzbieta Pollock RD [facility]  [phone]

## 2021-06-04 NOTE — PATIENT INSTRUCTIONS
Recommendations from today's MTM visit:                                                        1. Split metformin into two times per day- 2 tablets in the morning and 2 tablets in the evening with food.     Follow-up: Return in 1 week (on 6/4/2021) for Phone call with MTM.    It was great to speak with you today.  I value your experience and would be very thankful for your time with providing feedback on our clinic survey. You may receive a survey via email or text message in the next few days.     To schedule another MTM appointment, please call the clinic directly or you may call the MTM scheduling line at [phone] or toll-free at [phone].     My Clinical Pharmacist's contact information:                                                      Please feel free to contact me with any questions or concerns you have.      Nia Park, Pharm.D, [facility]  Medication Therapy Management Pharmacist  [phone]

## 2021-06-04 NOTE — PATIENT INSTRUCTIONS
Our Next Video appt is 7/6/21 at 9 am    1).  Keep testing your blood sugars in the morning when you wake up (fasting) and before your largest meal of the day and 2 hours after the start of the meal.  Goal is  fasting and before meals; less than 180 (2) hours after the start of a meal    2).  Keep being active!  If you can walk 30 minutes or more most days of the week, that would improve your insulin resistance (help you to use glucose/sugar better)    3).  30-60 grams of carbohydrate per meal (closer to 30-45 grams for wt loss); 0-22 grams per snack    4).  Keep track of your blood sugars using log sheet and keep writing down your foods - try counting and tracking your carbs.  Calorieking.com is another great tool you can use for looking up carbs!

## 2021-06-07 ENCOUNTER — HOSPITAL ENCOUNTER (OUTPATIENT)
Dept: MAMMOGRAPHY | Facility: CLINIC | Age: 46
Discharge: HOME | End: 2021-06-07
Attending: FAMILY MEDICINE | Admitting: FAMILY MEDICINE
Payer: COMMERCIAL

## 2021-06-07 DIAGNOSIS — Z12.31 VISIT FOR SCREENING MAMMOGRAM: ICD-10-CM

## 2021-06-07 PROCEDURE — 77063 BREAST TOMOSYNTHESIS BI: CPT

## 2021-06-11 ENCOUNTER — VIRTUAL VISIT (OUTPATIENT)
Dept: PHARMACY | Facility: PHYSICIAN GROUP | Age: 46
End: 2021-06-11
Payer: COMMERCIAL

## 2021-06-11 DIAGNOSIS — E11.65 TYPE 2 DIABETES MELLITUS WITH HYPERGLYCEMIA, UNSPECIFIED WHETHER LONG TERM INSULIN USE (H): Primary | ICD-10-CM

## 2021-06-11 PROCEDURE — 99606 MTMS BY PHARM EST 15 MIN: CPT | Performed by: PHARMACIST

## 2021-06-11 NOTE — PROGRESS NOTES
Medication Therapy Management (MTM) Encounter    ASSESSMENT:                            Medication Adherence/Access: No issues identified    Type 2 Diabetes: Patient is not meeting A1c goal of < 7%. Self monitoring of blood glucose is getting very close to goal of fasting  mg/dL and post prandial < 150 mg/dL. Patient would benefit from ongoing lifestyle efforts and increasing exercise.     PLAN:                            1. Continue same medications.     2. Diet and exercise changes to be the focus.     Follow-up: Return in 4 weeks (on 7/9/2021) for Phone call with MTM.    SUBJECTIVE/OBJECTIVE:                          Any Jackson is a 45 year old female called for a follow-up visit. She was referred to me from Dr. Murphy.  Today's visit is a follow-up MTM visit from 6/11     Reason for visit: diabetes follow up.    Allergies/ADRs: Reviewed in chart  Tobacco: She reports that she has quit smoking. She quit after 10.00 years of use. She does not have any smokeless tobacco history on file.  Alcohol: not currently using  Past Medical History: Reviewed in chart - GDM X 2  Social: 2 kids, daughter with heart condition     Medication Adherence/Access: no issues reported    Type 2 Diabetes:  Currently taking metformin ER 500mg 2 tablets twice daily, nausea is getting better with split dosing. Has been working to adjust her diet to cut out excess sugars and reduce carbohydrates.   Home monitoring- fasting/ post dinner,     Date FBG Dinner /2hours post   6/11 122    6/10 107 114   6/9 125 97/106   6/8 129 118/112   6/7 141 120/137   6/6 126 111/164   6/5 135 /107     Patient is not experiencing side effects.  Symptoms of low blood sugar? none  Symptoms of high blood sugar? vision changes  Eye exam: up to date  Diet/Exercise: discussion today around carbohydrate/sugar basics to start, will dive more into goals and targets for carbohydrates at future visit when more time is available.   Aspirin: Not taking  due to age  Statin: No   ACEi/ARB: No.   A1c 11.8% on 4/29    Today's Vitals: There were no vitals taken for this visit.  ----------------    I spent 10 minutes with this patient today. All changes were made via collaborative practice agreement with Dr. Murphy. A copy of the visit note was provided to the patient's primary care provider.    The patient declined a summary of these recommendations.     Nia Park, Pharm.D, [facility]  Medication Therapy Management Pharmacist  [phone]      Telemedicine Visit Details  Type of service:  Telephone visit  Start Time: 3:34 PM  End Time: 3:44 PM  Originating Location (patient location): Home  Distant Location (provider location):  [facility] M[facility]      Medication Therapy Recommendations  No medication therapy recommendations to display

## 2021-06-12 ENCOUNTER — HEALTH MAINTENANCE LETTER (OUTPATIENT)
Age: 46
End: 2021-06-12

## 2021-06-14 NOTE — PATIENT INSTRUCTIONS
Recommendations from today's MTM visit:                                                       1. Continue same medications.     2. Diet and exercise changes to be the focus.     Follow-up: Return in 3 weeks (on 7/2/2021) for Phone call with MTM.    It was great to speak with you today.  I value your experience and would be very thankful for your time with providing feedback on our clinic survey. You may receive a survey via email or text message in the next few days.     To schedule another MTM appointment, please call the clinic directly or you may call the MTM scheduling line at [phone] or toll-free at [phone].     My Clinical Pharmacist's contact information:                                                      Please feel free to contact me with any questions or concerns you have.      Nia Park, Pharm.D, [facility]  Medication Therapy Management Pharmacist  [phone]

## 2021-07-07 ENCOUNTER — VIRTUAL VISIT (OUTPATIENT)
Dept: EDUCATION SERVICES | Facility: OTHER | Age: 46
End: 2021-07-07
Payer: COMMERCIAL

## 2021-07-07 DIAGNOSIS — E11.9 TYPE 2 DIABETES MELLITUS (H): Primary | ICD-10-CM

## 2021-07-07 PROCEDURE — G0108 DIAB MANAGE TRN  PER INDIV: HCPCS | Performed by: DIETITIAN, REGISTERED

## 2021-07-07 NOTE — PROGRESS NOTES
"Diabetes Self-Management Education & Support    Type of service:  Video Visit    If the video visit is dropped, the video visit invitation should be resent by:Cell phone    Originating Location (pt. Location): Home  Distant Location (provider location): Home  Mode of Communication:  Video Conference via [employer]    Video Start Time: 0900  Video End Time (time video stopped): 1000    How would patient like to obtain AVS? Shielahart    Presents for: Follow-up    SUBJECTIVE/OBJECTIVE:  Presents for: Follow-up  Accompanied by: Self  Diabetes education in the past 24mo: Yes(With [facility])  Focus of Visit: Other(New dx DM)  Diabetes type: Type 2  Date of diagnosis: 2021  Disease course: Improving  How confident are you filling out medical forms by yourself:: Extremely  Diabetes management related comments/concerns: New dx - pt did have Gestational with 2 children  Transportation concerns: No  Difficulty affording diabetes medication?: No  Difficulty affording diabetes testing supplies?: No  Other concerns:: None  Cultural Influences/Ethnic Background:  American    Diabetes Symptoms & Complications:  Visual change: Yes(Says went to eye Dr, says now is settling down.)  Weight trend: Decreasing(overall down, but stable now - between 186-189#)  Complications assessed today?: No    Patient Problem List and Family Medical History reviewed for relevant medical history, current medical status, and diabetes risk factors.    Vitals:  There were no vitals taken for this visit.  Estimated body mass index is 30.99 kg/m  as calculated from the following:    Height as of 4/29/21: 1.676 m (5' 6\").    Weight as of 4/29/21: 87.1 kg (192 lb).   Last 3 BP:   BP Readings from Last 3 Encounters:   04/29/21 (!) 140/85   06/10/20 (!) 165/112   10/21/17 137/89       History   Smoking Status     Former Smoker     Years: 10.00   Smokeless Tobacco     Not on file       Labs:  Lab Results   Component Value Date    A1C 11.8 04/29/2021     No results found " for: GLC  No results found for: LDL  No results found for: HDL]  No results found for: GFRESTIMATED  No results found for: GFRESTBLACK  No results found for: CR  No results found for: MICROALBUMIN    Healthy Eating:  Healthy Eating Assessed Today: Yes  Cultural/Synagogue diet restrictions?: Yes([place] makes me sick)  Meal planning/habits: Avoiding sweets, Low carb, Other, Carb counting(Says spent last year eating sweets and candy)  How many times a week on average do you eat food made away from home (restaurant/take-out)?: 2  Meals include: Breakfast, Lunch, Dinner  Breakfast: 0800 english muffin, PB, and small amount honey cinnamon spread, egg (aims to stay around 30 grams), water  Lunch: 5299-2826 pb and jelly sandwich, little pack of nuts/dried cranberries and cheese, and diet coke, oreo (2 slices bread 19 grams carb)  Dinner: strawberry chicken salad with breadstick, strawberries, chicken, red onion, vinegar dressing  Snacks: granola bar, chocolate covered almonds  Beverages: Diet soda, Water, Coffee, Alcohol, Tea(may have a 2 beers (beck lite) - 1-2x per week)  Has patient met with a dietitian in the past?: Yes(For gestational diabetes)    Being Active:  Being Active Assessed Today: Yes  Exercise:: Yes  Days per week of moderate to strenuous exercise (like a brisk walk): 3  On average, minutes per day of exercise at this level: 60(aiming for 10,000 steps per day)  How intense was your typical exercise? : Moderate (like brisk walking)  Exercise Minutes per Week: 180  Barrier to exercise: None    Monitoring:  Monitoring Assessed Today: Yes  Did patient bring glucose meter to appointment? : Yes  Blood Glucose Meter: Accu-chek  Times checking blood sugar at home (number): 3  Times checking blood sugar at home (per): Day  Blood glucose trend: Decreasing    Date Breakfast  Lunch  Dinner  Bedtime   7/7 99 Before After Before After Before After    7/6 97    98 108    7/5 105    84 148    7/4 105    97 112    7/3 107   "  118 127    7/2 105    79 135    7/1 94     92    6/30 103     128      Date Breakfast  Lunch  Dinner  Bedtime    Before After Before After Before After    6/29 111    108 105    6/28 118    89 144    6/27 108    134 139    6/26 118    109 106    6/25     111 112    6/24 104    97 99    6/23 121    110 119          Taking Medications:  Diabetes Medication(s)     Biguanides       metFORMIN (GLUCOPHAGE-XR) 500 MG 24 hr tablet    Take 1,000 mg by mouth 2 times daily (with meals)           Taking Medication Assessed Today: Yes  Current Treatments: Oral Medication (taken by mouth)  Problems taking diabetes medications regularly?: No  Diabetes medication side effects?: Yes(may have constipation and next day diarrhea)    Problem Solving:  Problem Solving Assessed Today: Yes  Is the patient at risk for hypoglycemia?: No     Reducing Risks:  Reducing Risks Assessed Today: No  Diabetes Risks: History of gestational diabetes, Family History  Additional female risks: Gestational Diabetes  CAD Risks: Diabetes Mellitus, Family history  Has dilated eye exam at least once a year?: Yes  Sees dentist every 6 months?: No(on \"to do list\")  Feet checked by healthcare provider in the last year?: No    Healthy Coping:  Emotional response to diabetes: Ready to learn, Acceptance  Informal Support system:: Family  Stage of change: ACTION (Actively working towards change)  Patient Activation Measure Survey Score:  No flowsheet data found.    Diabetes knowledge and skills assessment:   Patient is knowledgeable in diabetes management concepts related to: Monitoring    Patient needs further education on the following diabetes management concepts: Healthy Eating and Being Active    Based on learning assessment above, most appropriate setting for further diabetes education would be: Individual setting.      INTERVENTIONS:    Education provided today on:  AADE Self-Care Behaviors:  Healthy Eating: carbohydrate counting, consistency in amount, " composition, and timing of food intake and plate planning method  Being Active: relationship to blood glucose  Monitoring: frequency of monitoring    Opportunities for ongoing education and support in diabetes-self management were discussed.    Pt verbalized understanding of concepts discussed and recommendations provided today.       Education Materials Provided:  [employer] Healthy Living with Diabetes Book, AVS      ASSESSMENT:  Pt is doing well.   98% in target with blood sugars.  Introduced to heart healthy diet today.  Will finish education next visit.  Encouraged more fiber to regulate digestive health.  Follow up 1 month.        Patient's most recent   Lab Results   Component Value Date    A1C 11.8 04/29/2021    is not meeting goal of <7.0    PLAN  See Patient Instructions for co-developed, patient-stated behavior change goals.  AVS printed and provided to patient today. See Follow-Up section for recommended follow-up.    Time Spent: 60 minutes  Encounter Type: Individual    Any diabetes medication dose changes were made via the CDE Protocol and Collaborative Practice Agreement with the patient's referring provider. A copy of this encounter was shared with the provider.    Elzbieta Pollock RD [facility]  [phone]

## 2021-07-07 NOTE — LETTER
"    7/7/2021         RE: Any Jackson  [address]  [place] [address]        Dear Colleague,    Thank you for referring your patient, Any Jackson, to the [facility]. Please see a copy of my visit note below.    Diabetes Self-Management Education & Support    Type of service:  Video Visit    If the video visit is dropped, the video visit invitation should be resent by:Cell phone    Originating Location (pt. Location): Home  Distant Location (provider location): Home  Mode of Communication:  Video Conference via [email or web address]    Video Start Time: 0900  Video End Time (time video stopped): 1000    How would patient like to obtain AVS? MyChart    Presents for: Follow-up    SUBJECTIVE/OBJECTIVE:  Presents for: Follow-up  Accompanied by: Self  Diabetes education in the past 24mo: Yes(With [facility])  Focus of Visit: Other(New dx DM)  Diabetes type: Type 2  Date of diagnosis: 2021  Disease course: Improving  How confident are you filling out medical forms by yourself:: Extremely  Diabetes management related comments/concerns: New dx - pt did have Gestational with 2 children  Transportation concerns: No  Difficulty affording diabetes medication?: No  Difficulty affording diabetes testing supplies?: No  Other concerns:: None  Cultural Influences/Ethnic Background:  American    Diabetes Symptoms & Complications:  Visual change: Yes(Says went to eye Dr, says now is settling down.)  Weight trend: Decreasing(overall down, but stable now - between 186-189#)  Complications assessed today?: No    Patient Problem List and Family Medical History reviewed for relevant medical history, current medical status, and diabetes risk factors.    Vitals:  There were no vitals taken for this visit.  Estimated body mass index is 30.99 kg/m  as calculated from the following:    Height as of 4/29/21: 1.676 m (5' 6\").    Weight as of 4/29/21: 87.1 kg (192 lb).   Last 3 BP:   BP Readings from Last 3 " Encounters:   04/29/21 (!) 140/85   06/10/20 (!) 165/112   10/21/17 137/89       History   Smoking Status     Former Smoker     Years: 10.00   Smokeless Tobacco     Not on file       Labs:  Lab Results   Component Value Date    A1C 11.8 04/29/2021     No results found for: GLC  No results found for: LDL  No results found for: HDL]  No results found for: GFRESTIMATED  No results found for: GFRESTBLACK  No results found for: CR  No results found for: MICROALBUMIN    Healthy Eating:  Healthy Eating Assessed Today: Yes  Cultural/Judaism diet restrictions?: Yes([place] makes me sick)  Meal planning/habits: Avoiding sweets, Low carb, Other, Carb counting(Says spent last year eating sweets and candy)  How many times a week on average do you eat food made away from home (restaurant/take-out)?: 2  Meals include: Breakfast, Lunch, Dinner  Breakfast: 0800 english muffin, PB, and small amount honey cinnamon spread, egg (aims to stay around 30 grams), water  Lunch: 5143-4323 pb and jelly sandwich, little pack of nuts/dried cranberries and cheese, and diet coke, oreo (2 slices bread 19 grams carb)  Dinner: strawberry chicken salad with breadstick, strawberries, chicken, red onion, vinegar dressing  Snacks: granola bar, chocolate covered almonds  Beverages: Diet soda, Water, Coffee, Alcohol, Tea(may have a 2 beers (beck lite) - 1-2x per week)  Has patient met with a dietitian in the past?: Yes(For gestational diabetes)    Being Active:  Being Active Assessed Today: Yes  Exercise:: Yes  Days per week of moderate to strenuous exercise (like a brisk walk): 3  On average, minutes per day of exercise at this level: 60(aiming for 10,000 steps per day)  How intense was your typical exercise? : Moderate (like brisk walking)  Exercise Minutes per Week: 180  Barrier to exercise: None    Monitoring:  Monitoring Assessed Today: Yes  Did patient bring glucose meter to appointment? : Yes  Blood Glucose Meter: Accu-chek  Times checking blood  "sugar at home (number): 3  Times checking blood sugar at home (per): Day  Blood glucose trend: Decreasing    Date Breakfast  Lunch  Dinner  Bedtime   7/7 99 Before After Before After Before After    7/6 97    98 108    7/5 105    84 148    7/4 105    97 112    7/3 107    118 127    7/2 105    79 135    7/1 94     92    6/30 103     128      Date Breakfast  Lunch  Dinner  Bedtime    Before After Before After Before After    6/29 111    108 105    6/28 118    89 144    6/27 108    134 139    6/26 118    109 106    6/25     111 112    6/24 104    97 99    6/23 121    110 119          Taking Medications:  Diabetes Medication(s)     Biguanides       metFORMIN (GLUCOPHAGE-XR) 500 MG 24 hr tablet    Take 1,000 mg by mouth 2 times daily (with meals)           Taking Medication Assessed Today: Yes  Current Treatments: Oral Medication (taken by mouth)  Problems taking diabetes medications regularly?: No  Diabetes medication side effects?: Yes(may have constipation and next day diarrhea)    Problem Solving:  Problem Solving Assessed Today: Yes  Is the patient at risk for hypoglycemia?: No     Reducing Risks:  Reducing Risks Assessed Today: No  Diabetes Risks: History of gestational diabetes, Family History  Additional female risks: Gestational Diabetes  CAD Risks: Diabetes Mellitus, Family history  Has dilated eye exam at least once a year?: Yes  Sees dentist every 6 months?: No(on \"to do list\")  Feet checked by healthcare provider in the last year?: No    Healthy Coping:  Emotional response to diabetes: Ready to learn, Acceptance  Informal Support system:: Family  Stage of change: ACTION (Actively working towards change)  Patient Activation Measure Survey Score:  No flowsheet data found.    Diabetes knowledge and skills assessment:   Patient is knowledgeable in diabetes management concepts related to: Monitoring    Patient needs further education on the following diabetes management concepts: Healthy Eating and Being " Active    Based on learning assessment above, most appropriate setting for further diabetes education would be: Individual setting.      INTERVENTIONS:    Education provided today on:  AADE Self-Care Behaviors:  Healthy Eating: carbohydrate counting, consistency in amount, composition, and timing of food intake and plate planning method  Being Active: relationship to blood glucose  Monitoring: frequency of monitoring    Opportunities for ongoing education and support in diabetes-self management were discussed.    Pt verbalized understanding of concepts discussed and recommendations provided today.       Education Materials Provided:  [employer] Healthy Living with Diabetes Book, AVS      ASSESSMENT:  Pt is doing well.   98% in target with blood sugars.  Introduced to heart healthy diet today.  Will finish education next visit.  Encouraged more fiber to regulate digestive health.  Follow up 1 month.        Patient's most recent   Lab Results   Component Value Date    A1C 11.8 04/29/2021    is not meeting goal of <7.0    PLAN  See Patient Instructions for co-developed, patient-stated behavior change goals.  AVS printed and provided to patient today. See Follow-Up section for recommended follow-up.    Time Spent: 60 minutes  Encounter Type: Individual    Any diabetes medication dose changes were made via the CDE Protocol and Collaborative Practice Agreement with the patient's referring provider. A copy of this encounter was shared with the provider.    Elzbieta Pollock RD [facility]  [phone]

## 2021-07-07 NOTE — PATIENT INSTRUCTIONS
1).  Continue to work on adding more balance to your diet - more non starchy vegetables, 2-3 servings of fruit daily, include whole grains    2).  Try adding strength training exercises to your routine 2 times weekly    3).  Reduce blood sugar testing to:  Fasting, skip a day, before and 2 hours after a meal, skip a day, repeat pattern

## 2021-07-09 ENCOUNTER — VIRTUAL VISIT (OUTPATIENT)
Dept: PHARMACY | Facility: PHYSICIAN GROUP | Age: 46
End: 2021-07-09
Payer: COMMERCIAL

## 2021-07-09 DIAGNOSIS — E11.65 TYPE 2 DIABETES MELLITUS WITH HYPERGLYCEMIA, UNSPECIFIED WHETHER LONG TERM INSULIN USE (H): Primary | ICD-10-CM

## 2021-07-09 PROCEDURE — 99606 MTMS BY PHARM EST 15 MIN: CPT | Performed by: PHARMACIST

## 2021-07-09 NOTE — PATIENT INSTRUCTIONS
Recommendations from today's MTM visit:                                                       1. Continue great work on the dietary changes!    2. See Dr. Murphy end of July for diabetes check, also suggest we recheck your cholesterol levels to make sure we are doing what we can to keep the heart disease risk down.     3. Reach out at any time with questions or concerns on the medications.    Follow-up: Return in 3 weeks (on 7/30/2021) for Routine Visit, Lab Work.    It was great to speak with you today.  I value your experience and would be very thankful for your time with providing feedback on our clinic survey. You may receive a survey via email or text message in the next few days.     To schedule another MTM appointment, please call the clinic directly or you may call the MTM scheduling line at [phone] or toll-free at [phone].     My Clinical Pharmacist's contact information:                                                      Please feel free to contact me with any questions or concerns you have.      Nia Park, Pharm.D, BCACP  Medication Therapy Management Pharmacist  [phone]

## 2021-07-09 NOTE — PROGRESS NOTES
Medication Therapy Management (MTM) Encounter    ASSESSMENT:                            Medication Adherence/Access: No issues identified    Type 2 Diabetes: Patient is not meeting A1c goal of < 7%. Self monitoring of blood glucose is at goal of fasting  mg/dL and post prandial < 150 mg/dL. Patient would benefit from updated a1c with Dr. Murphy end of July, has made significant improvements to lower blood sugars. Recommend rechecking lipids at follow up to initiated statin if LDL >100 and patient is willing.     PLAN:                            1. Continue great work on the dietary changes!    2. See Dr. Murphy end of July for diabetes check, also suggest we recheck your cholesterol levels to make sure we are doing what we can to keep the heart disease risk down.     3. Reach out at any time with questions or concerns on the medications.    Follow-up:MTM as needed, patient will see PCP end of this month.     SUBJECTIVE/OBJECTIVE:                          Any Jackson is a 45 year old female called for a follow-up visit. She was referred to me from .  Today's visit is a follow-up MTM visit from 6/11     Reason for visit: diabetes follow up, working with diabetes education around lifestyle efforts.     Allergies/ADRs: Reviewed in chart  Tobacco: She reports that she has quit smoking. She quit after 10.00 years of use. She does not have any smokeless tobacco history on file.  Alcohol: not currently using  Past Medical History: Reviewed in chart - GDM X 2  Social: 2 kids, daughter with heart condition     Medication Adherence/Access: no issues reported    Type 2 Diabetes:  Currently taking metformin ER 500mg 2 tablets twice daily, diarrhea is getting better with split dosing. Has been working to adjust her diet to cut out excess sugars and reduce carbohydrates. Working in some more fiber.     Home monitoring- Fasting, skip a day, before and 2 hours after a meal, skip a day, repeat  pattern    7/8- 97 fasting and  95/105 pre/post dinner    Patient is not experiencing side effects.  Symptoms of low blood sugar? none  Symptoms of high blood sugar? vision changes  Eye exam: up to date  Diet/Exercise: discussion today around carbohydrate/sugar basics to start, will dive more into goals and targets for carbohydrates at future visit when more time is available.   Aspirin: Not taking due to age  Statin: No   ACEi/ARB: No.   A1c 11.8% on 4/29      Today's Vitals: There were no vitals taken for this visit.  ----------------      I spent 10 minutes with this patient today. All changes were made via collaborative practice agreement with Dr. Murphy. A copy of the visit note was provided to the patient's primary care provider.    The patient was sent via [employer] a summary of these recommendations.     Nia Park, Pharm.D, BCACP  Medication Therapy Management Pharmacist  [phone]      Telemedicine Visit Details  Type of service:  Telephone visit  Start Time: 3:23 PM  End Time: 3:33 PM  Originating Location (patient location): Home  Distant Location (provider location):  [facility]      Medication Therapy Recommendations  No medication therapy recommendations to display

## 2021-07-30 ENCOUNTER — TRANSFERRED RECORDS (OUTPATIENT)
Dept: HEALTH INFORMATION MANAGEMENT | Facility: CLINIC | Age: 46
End: 2021-07-30

## 2021-07-30 LAB — HBA1C MFR BLD: 6.2 % (ref 4.8–5.6)

## 2021-08-07 ENCOUNTER — HEALTH MAINTENANCE LETTER (OUTPATIENT)
Age: 46
End: 2021-08-07

## 2021-08-16 VITALS — SYSTOLIC BLOOD PRESSURE: 130 MMHG | DIASTOLIC BLOOD PRESSURE: 85 MMHG

## 2021-09-17 ENCOUNTER — VIRTUAL VISIT (OUTPATIENT)
Dept: EDUCATION SERVICES | Facility: OTHER | Age: 46
End: 2021-09-17
Payer: COMMERCIAL

## 2021-09-17 DIAGNOSIS — E11.9 DIABETES MELLITUS WITHOUT COMPLICATION (H): Primary | ICD-10-CM

## 2021-09-17 PROCEDURE — G0108 DIAB MANAGE TRN  PER INDIV: HCPCS | Mod: 95 | Performed by: DIETITIAN, REGISTERED

## 2021-09-17 NOTE — PATIENT INSTRUCTIONS
1).  Continue to check blood sugars every other day like you have been.  Goal  before meals/fasting; Less than 180 (2) hours after start of meal    2).  Good job increasing your exercise!    3).  Follow up with Diab Education annually or before then if issues arise.

## 2021-09-17 NOTE — PROGRESS NOTES
"  Diabetes Self-Management Education & Support    Video Visit    Presents for: Follow-up    SUBJECTIVE/OBJECTIVE:  Presents for: Follow-up  Accompanied by: Self  Diabetes education in the past 24mo: Yes (With [facility])  Focus of Visit: Other (New dx DM)  Diabetes type: Type 2  Date of diagnosis: 2021  Disease course: Improving  How confident are you filling out medical forms by yourself:: Extremely  Diabetes management related comments/concerns: New dx - pt did have Gestational with 2 children  Transportation concerns: No  Difficulty affording diabetes medication?: No  Difficulty affording diabetes testing supplies?: No  Other concerns:: None  Cultural Influences/Ethnic Background:  Other    Diabetes Symptoms & Complications:  Visual change: Yes (no stable)  Weight trend: Decreasing (182# today)  Complications assessed today?: No    Patient Problem List and Family Medical History reviewed for relevant medical history, current medical status, and diabetes risk factors.    Vitals:  There were no vitals taken for this visit.  Estimated body mass index is 30.99 kg/m  as calculated from the following:    Height as of 4/29/21: 1.676 m (5' 6\").    Weight as of 4/29/21: 87.1 kg (192 lb).   Last 3 BP:   BP Readings from Last 3 Encounters:   07/29/21 130/85   04/29/21 (!) 140/85   06/10/20 (!) 165/112       History   Smoking Status     Former Smoker     Years: 10.00   Smokeless Tobacco     Not on file       Labs:  Lab Results   Component Value Date    A1C 11.8 04/29/2021     Updated A1C is 6.2 for 7/30/21    No results found for: GLC  No results found for: LDL  No results found for: HDL]  No results found for: GFRESTIMATED  No results found for: GFRESTBLACK  No results found for: CR  No results found for: MICROALBUMIN    Healthy Eating:  Healthy Eating Assessed Today: Yes  Cultural/Sabianism diet restrictions?: Yes ([place] makes me sick)  Meal planning/habits: Avoiding sweets, Low carb, Carb counting  How many times a week on " average do you eat food made away from home (restaurant/take-out)?: 2  Meals include: Breakfast, Lunch, Dinner  Breakfast: 0800 english muffin, PB, and small amount honey cinnamon spread, egg (aims to stay around 30 grams), water  Lunch: 2692-2215 pb and jelly sandwich, little pack of nuts/dried cranberries and cheese, and diet coke,  (2 slices bread 19 grams carb), veggies with dip, cheese stick, yogurt (stays with 45 grams of carb)  Dinner: protein/vegetable and starch  Snacks: granola bar with protein in it  Beverages: Diet soda, Water, Coffee, Alcohol, Tea (may have a 2 beers (beck lite) - 1-2x per week)  Has patient met with a dietitian in the past?: Yes (For gestational diabetes)    Being Active:  Being Active Assessed Today: Yes  Exercise:: Yes  Days per week of moderate to strenuous exercise (like a brisk walk): 3  On average, minutes per day of exercise at this level: 60 (aiming for 10,000 steps per day)  How intense was your typical exercise? : Moderate (like brisk walking) (Biking with family and going on hikes)  Exercise Minutes per Week: 180  Barrier to exercise: None    Monitoring:  Monitoring Assessed Today: Yes  Did patient bring glucose meter to appointment? : Yes  Blood Glucose Meter: Accu-chek  Times checking blood sugar at home (number): 1 (every other day 1-2 times per day)  Times checking blood sugar at home (per): Day  Blood glucose trend: Decreasing    Pt's blood sugars were 100% in target upon review    Taking Medications:  Diabetes Medication(s)     Biguanides       metFORMIN (GLUCOPHAGE-XR) 500 MG 24 hr tablet    Take 1,000 mg by mouth 2 times daily (with meals)           Taking Medication Assessed Today: Yes  Current Treatments: Oral Medication (taken by mouth)  Problems taking diabetes medications regularly?: No  Diabetes medication side effects?: Yes (this seems to be improved)    Problem Solving:  Problem Solving Assessed Today: Yes  Is the patient at risk for hypoglycemia?: No      "         Reducing Risks:  Reducing Risks Assessed Today: No  Diabetes Risks: History of gestational diabetes, Family History  Additional female risks: Gestational Diabetes  CAD Risks: Diabetes Mellitus, Family history  Has dilated eye exam at least once a year?: Yes  Sees dentist every 6 months?: No (on \"to do list\")  Feet checked by healthcare provider in the last year?: No    Healthy Coping:  Emotional response to diabetes: Ready to learn, Acceptance  Informal Support system:: Family  Stage of change: ACTION (Actively working towards change)  Patient Activation Measure Survey Score:  No flowsheet data found.    Diabetes knowledge and skills assessment:   Patient is knowledgeable in diabetes management concepts related to: Healthy Eating and Monitoring    Patient needs further education on the following diabetes management concepts: Healthy Eating and Monitoring    Based on learning assessment above, most appropriate setting for further diabetes education would be: Individual setting.      INTERVENTIONS:    Education provided today on:  AADE Self-Care Behaviors:  Monitoring: individual blood glucose targets and frequency of monitoring  Taking Medication: action of prescribed medication and when to take medications  Reducing Risks: major complications of diabetes, prevention, early diagnostic measures and treatment of complications, foot care, appropriate dental care, annual eye exam, A1C - goals, relating to blood glucose levels, how often to check, lipids levels and goals and blood pressure and goals    Opportunities for ongoing education and support in diabetes-self management were discussed.    Pt verbalized understanding of concepts discussed and recommendations provided today.       Education Materials Provided:  NORMA      ASSESSMENT:  Pt is following up with Diab Education for final new dx review.  Pt did review all of her blood sugars with writer, unfortunately, writer erased blood sugars and unable to " retrieve.  Pt's blood sugars were 100 percent in target and checking every other day (fasting and before and 2 hours after start of meal).  Pt continues to work on her diet for both DM and hear health.  Rest of new dx teaching completed today.  Pt encouraged to follow up annually or before then if issues.        Patient's most recent   Lab Results   Component Value Date    A1C 11.8 04/29/2021    is meeting goal of <7.0    PLAN  See Patient Instructions for co-developed, patient-stated behavior change goals.  AVS printed and provided to patient today. See Follow-Up section for recommended follow-up.      Time Spent: 45 minutes  Encounter Type: Individual    Any diabetes medication dose changes were made via the CDE Protocol and Collaborative Practice Agreement with the patient's referring provider.    Elzbieta Pollock RD [facility]  [phone]

## 2021-09-17 NOTE — LETTER
"    9/17/2021         RE: Any Jackson  [address]  [place] [address]        Dear Colleague,    Thank you for referring your patient, Any Jackson, to the [facility]. Please see a copy of my visit note below.      Diabetes Self-Management Education & Support    Video Visit    Presents for: Follow-up    SUBJECTIVE/OBJECTIVE:  Presents for: Follow-up  Accompanied by: Self  Diabetes education in the past 24mo: Yes (With [facility])  Focus of Visit: Other (New dx DM)  Diabetes type: Type 2  Date of diagnosis: 2021  Disease course: Improving  How confident are you filling out medical forms by yourself:: Extremely  Diabetes management related comments/concerns: New dx - pt did have Gestational with 2 children  Transportation concerns: No  Difficulty affording diabetes medication?: No  Difficulty affording diabetes testing supplies?: No  Other concerns:: None  Cultural Influences/Ethnic Background:  Other    Diabetes Symptoms & Complications:  Visual change: Yes (no stable)  Weight trend: Decreasing (182# today)  Complications assessed today?: No    Patient Problem List and Family Medical History reviewed for relevant medical history, current medical status, and diabetes risk factors.    Vitals:  There were no vitals taken for this visit.  Estimated body mass index is 30.99 kg/m  as calculated from the following:    Height as of 4/29/21: 1.676 m (5' 6\").    Weight as of 4/29/21: 87.1 kg (192 lb).   Last 3 BP:   BP Readings from Last 3 Encounters:   07/29/21 130/85   04/29/21 (!) 140/85   06/10/20 (!) 165/112       History   Smoking Status     Former Smoker     Years: 10.00   Smokeless Tobacco     Not on file       Labs:  Lab Results   Component Value Date    A1C 11.8 04/29/2021     Updated A1C is 6.2 for 7/30/21    No results found for: GLC  No results found for: LDL  No results found for: HDL]  No results found for: GFRESTIMATED  No results found for: GFRESTBLACK  No results " found for: CR  No results found for: MICROALBUMIN    Healthy Eating:  Healthy Eating Assessed Today: Yes  Cultural/Bahai diet restrictions?: Yes ([place] makes me sick)  Meal planning/habits: Avoiding sweets, Low carb, Carb counting  How many times a week on average do you eat food made away from home (restaurant/take-out)?: 2  Meals include: Breakfast, Lunch, Dinner  Breakfast: 0800 english muffin, PB, and small amount honey cinnamon spread, egg (aims to stay around 30 grams), water  Lunch: 7383-7598 pb and jelly sandwich, little pack of nuts/dried cranberries and cheese, and diet coke,  (2 slices bread 19 grams carb), veggies with dip, cheese stick, yogurt (stays with 45 grams of carb)  Dinner: protein/vegetable and starch  Snacks: granola bar with protein in it  Beverages: Diet soda, Water, Coffee, Alcohol, Tea (may have a 2 beers (beck lite) - 1-2x per week)  Has patient met with a dietitian in the past?: Yes (For gestational diabetes)    Being Active:  Being Active Assessed Today: Yes  Exercise:: Yes  Days per week of moderate to strenuous exercise (like a brisk walk): 3  On average, minutes per day of exercise at this level: 60 (aiming for 10,000 steps per day)  How intense was your typical exercise? : Moderate (like brisk walking) (Biking with family and going on hikes)  Exercise Minutes per Week: 180  Barrier to exercise: None    Monitoring:  Monitoring Assessed Today: Yes  Did patient bring glucose meter to appointment? : Yes  Blood Glucose Meter: Accu-chek  Times checking blood sugar at home (number): 1 (every other day 1-2 times per day)  Times checking blood sugar at home (per): Day  Blood glucose trend: Decreasing    Pt's blood sugars were 100% in target upon review    Taking Medications:  Diabetes Medication(s)     Biguanides       metFORMIN (GLUCOPHAGE-XR) 500 MG 24 hr tablet    Take 1,000 mg by mouth 2 times daily (with meals)           Taking Medication Assessed Today: Yes  Current Treatments:  "Oral Medication (taken by mouth)  Problems taking diabetes medications regularly?: No  Diabetes medication side effects?: Yes (this seems to be improved)    Problem Solving:  Problem Solving Assessed Today: Yes  Is the patient at risk for hypoglycemia?: No              Reducing Risks:  Reducing Risks Assessed Today: No  Diabetes Risks: History of gestational diabetes, Family History  Additional female risks: Gestational Diabetes  CAD Risks: Diabetes Mellitus, Family history  Has dilated eye exam at least once a year?: Yes  Sees dentist every 6 months?: No (on \"to do list\")  Feet checked by healthcare provider in the last year?: No    Healthy Coping:  Emotional response to diabetes: Ready to learn, Acceptance  Informal Support system:: Family  Stage of change: ACTION (Actively working towards change)  Patient Activation Measure Survey Score:  No flowsheet data found.    Diabetes knowledge and skills assessment:   Patient is knowledgeable in diabetes management concepts related to: Healthy Eating and Monitoring    Patient needs further education on the following diabetes management concepts: Healthy Eating and Monitoring    Based on learning assessment above, most appropriate setting for further diabetes education would be: Individual setting.      INTERVENTIONS:    Education provided today on:  AADE Self-Care Behaviors:  Monitoring: individual blood glucose targets and frequency of monitoring  Taking Medication: action of prescribed medication and when to take medications  Reducing Risks: major complications of diabetes, prevention, early diagnostic measures and treatment of complications, foot care, appropriate dental care, annual eye exam, A1C - goals, relating to blood glucose levels, how often to check, lipids levels and goals and blood pressure and goals    Opportunities for ongoing education and support in diabetes-self management were discussed.    Pt verbalized understanding of concepts discussed and " recommendations provided today.       Education Materials Provided:  AVS      ASSESSMENT:  Pt is following up with [facility] Education for final new dx review.  Pt did review all of her blood sugars with writer, unfortunately, writer erased blood sugars and unable to retrieve.  Pt's blood sugars were 100 percent in target and checking every other day (fasting and before and 2 hours after start of meal).  Pt continues to work on her diet for both DM and hear health.  Rest of new dx teaching completed today.  Pt encouraged to follow up annually or before then if issues.        Patient's most recent   Lab Results   Component Value Date    A1C 11.8 04/29/2021    is meeting goal of <7.0    PLAN  See Patient Instructions for co-developed, patient-stated behavior change goals.  AVS printed and provided to patient today. See Follow-Up section for recommended follow-up.      Time Spent: 45 minutes  Encounter Type: Individual    Any diabetes medication dose changes were made via the CDE Protocol and Collaborative Practice Agreement with the patient's referring provider.    Elzbieta Pollock RD [facility]  [phone]

## 2021-10-02 ENCOUNTER — HEALTH MAINTENANCE LETTER (OUTPATIENT)
Age: 46
End: 2021-10-02

## 2021-11-27 ENCOUNTER — HEALTH MAINTENANCE LETTER (OUTPATIENT)
Age: 46
End: 2021-11-27

## 2021-12-14 ENCOUNTER — TRANSFERRED RECORDS (OUTPATIENT)
Dept: HEALTH INFORMATION MANAGEMENT | Facility: CLINIC | Age: 46
End: 2021-12-14
Payer: COMMERCIAL

## 2021-12-14 LAB — HBA1C MFR BLD: 5.6 % (ref 4.8–5.6)

## 2021-12-20 RX ORDER — ATORVASTATIN CALCIUM 10 MG/1
10 TABLET, FILM COATED ORAL DAILY
COMMUNITY

## 2022-03-19 ENCOUNTER — HEALTH MAINTENANCE LETTER (OUTPATIENT)
Age: 47
End: 2022-03-19

## 2022-07-09 ENCOUNTER — HEALTH MAINTENANCE LETTER (OUTPATIENT)
Age: 47
End: 2022-07-09

## 2022-08-26 ENCOUNTER — TRANSFERRED RECORDS (OUTPATIENT)
Dept: HEALTH INFORMATION MANAGEMENT | Facility: CLINIC | Age: 47
End: 2022-08-26

## 2022-09-03 ENCOUNTER — HEALTH MAINTENANCE LETTER (OUTPATIENT)
Age: 47
End: 2022-09-03

## 2022-09-26 ENCOUNTER — ANCILLARY PROCEDURE (OUTPATIENT)
Dept: MAMMOGRAPHY | Facility: CLINIC | Age: 47
End: 2022-09-26
Attending: FAMILY MEDICINE
Payer: COMMERCIAL

## 2022-09-26 DIAGNOSIS — Z12.31 VISIT FOR SCREENING MAMMOGRAM: ICD-10-CM

## 2022-09-26 PROCEDURE — 77067 SCR MAMMO BI INCL CAD: CPT | Mod: TC | Performed by: RADIOLOGY

## 2022-09-26 PROCEDURE — 77063 BREAST TOMOSYNTHESIS BI: CPT | Mod: TC | Performed by: RADIOLOGY

## 2023-01-15 ENCOUNTER — HEALTH MAINTENANCE LETTER (OUTPATIENT)
Age: 48
End: 2023-01-15

## 2023-04-11 NOTE — PROGRESS NOTES
"Diabetes Self-Management Education & Support    Type of service:  Video Visit    If the video visit is dropped, the video visit invitation should be resent by: cell phone    Originating Location (pt. Location):  Work  Distant Location (provider location): Home  Mode of Communication:  Video Conference via [employer]        Video Start Time: 9:07 AM  Video End Time (time video stopped): 10:08 am    How would patient like to obtain AVS? Shielahart       Presents for: Initial Assessment for new diagnosis    SUBJECTIVE/OBJECTIVE:  Presents for: Initial Assessment for new diagnosis  Accompanied by: Self  Diabetes education in the past 24mo: Yes(With [facility])  Focus of Visit: Other(New dx DM)  Diabetes type: Type 2  Date of diagnosis: 2021  Disease course: Improving  How confident are you filling out medical forms by yourself:: Extremely  Diabetes management related comments/concerns: New dx - pt did have Gestational with 2 children  Transportation concerns: No  Difficulty affording diabetes medication?: No  Difficulty affording diabetes testing supplies?: No  Other concerns:: None  Cultural Influences/Ethnic Background:  American    Diabetes Symptoms & Complications:  Visual change: Yes  Complications assessed today?: No    Patient Problem List and Family Medical History reviewed for relevant medical history, current medical status, and diabetes risk factors.    Vitals:  There were no vitals taken for this visit.  Estimated body mass index is 30.99 kg/m  as calculated from the following:    Height as of 4/29/21: 1.676 m (5' 6\").    Weight as of 4/29/21: 87.1 kg (192 lb).   Last 3 BP:   BP Readings from Last 3 Encounters:   04/29/21 (!) 140/85   06/10/20 (!) 165/112   10/21/17 137/89       History   Smoking Status     Former Smoker     Years: 10.00   Smokeless Tobacco     Not on file       Labs:  Lab Results   Component Value Date    A1C 11.8 04/29/2021     No results found for: GLC  No results found for: LDL  No results found " for: HDL]  No results found for: GFRESTIMATED  No results found for: GFRESTBLACK  No results found for: CR  No results found for: MICROALBUMIN    Healthy Eating:  Healthy Eating Assessed Today: Yes  Cultural/Caodaism diet restrictions?: Yes([place] makes me sick)  Meal planning/habits: Avoiding sweets, Low carb, Other, Carb counting(Says spent last year eating sweets and candy)  How many times a week on average do you eat food made away from home (restaurant/take-out)?: 2  Meals include: Breakfast, Lunch, Dinner  Breakfast: 6502-8398 English muffin and egg, sometimes with slice of cheese; PB and Jelly sometimes on there and egg; avocado on it.  Water.  Sometimes will have coffee  Lunch: 1200 Chicken salad, wheat thins, and cheese; half PB and Jelly sandwich, pretzel, yogurt and 1 indira cracker, diet coke  Dinner: 8585-7812 Chipotle - salad, beck lite; chicken breast, gauri salad, and 6 chocolate covered almonds; salad with grilled chx diet coke and rum/chicken spring rolls, dilly bar (picked bc lowest carb item)  Snacks: granola bar; 8 chocolate covered almonds; doesn't feel as hungry lately for snacks  Other: used a meal service, but got tired of it  Beverages: Diet soda, Water, Coffee, Alcohol, Tea  Has patient met with a dietitian in the past?: Yes(For gestational diabetes)    Being Active:  Being Active Assessed Today: Yes  Exercise:: Yes  Days per week of moderate to strenuous exercise (like a brisk walk): 3  On average, minutes per day of exercise at this level: 60  How intense was your typical exercise? : Moderate (like brisk walking)  Exercise Minutes per Week: 180  Barrier to exercise: None    Monitoring:  Monitoring Assessed Today: Yes  Did patient bring glucose meter to appointment? : No  Blood Glucose Meter: Accu-chek  Times checking blood sugar at home (number): 3  Times checking blood sugar at home (per): Day  Blood glucose trend: Decreasing    Left meter at home - says numbers looking good.  112  fasting this morning and less than 120 after eating.  Mostly numbers under 140        Taking Medications:  Diabetes Medication(s)     Biguanides       metFORMIN (GLUCOPHAGE-XR) 500 MG 24 hr tablet    Take 1,000 mg by mouth 2 times daily (with meals)           Taking Medication Assessed Today: Yes  Current Treatments: Oral Medication (taken by mouth)  Problems taking diabetes medications regularly?: Yes  Diabetes medication side effects?: Yes(loose stools from metformin)    Problem Solving:  Problem Solving Assessed Today: Yes  Is the patient at risk for hypoglycemia?: No     Reducing Risks:  Diabetes Risks: History of gestational diabetes, Family History  Additional female risks: Gestational Diabetes  CAD Risks: Diabetes Mellitus, Family history  Has dilated eye exam at least once a year?: Yes  Sees dentist every 6 months?: No  Feet checked by healthcare provider in the last year?: No    Healthy Coping:  Emotional response to diabetes: Ready to learn, Acceptance  Informal Support system:: Family  Stage of change: PREPARATION (Decided to change - considering how)  Patient Activation Measure Survey Score:  No flowsheet data found.    Diabetes knowledge and skills assessment:   Patient is knowledgeable in diabetes management concepts related to: Monitoring and Taking Medication    Patient needs further education on the following diabetes management concepts: Healthy Eating and Monitoring    Based on learning assessment above, most appropriate setting for further diabetes education would be: Individual setting.      INTERVENTIONS:    Education provided today on:  AADE Self-Care Behaviors:  Healthy Eating: carbohydrate counting and label reading  Being Active: relationship to blood glucose  Monitoring: log and interpret results, individual blood glucose targets and frequency of monitoring    Opportunities for ongoing education and support in diabetes-self management were discussed.    Pt verbalized understanding of  concepts discussed and recommendations provided today.       Education Materials Provided:  [facility] Understanding Diabetes Booklet, BG Log Sheet and Carbohydrate Counting, sharps disposal, and food log       ASSESSMENT:  Newly dx with diabetes.  On max dose metformin and blood sugars are improving.  Pt is experiencing loose stools on metformin, but is changing around how she takes this is medication per MTM recommendations, and pt is hoping this will help.  Pt with hx of Gestational Diabetes, and familiar with carbohydrate counting, but re-introduced this today as well as guidelines for ETOH and diabetes, blood glucose targets, and physical activity guidelines.  Provide use of fiber supplement recommendations.  Will follow up with pt in about a month to continue education.        Patient's most recent   Lab Results   Component Value Date    A1C 11.8 04/29/2021    is not meeting goal of <7.0    PLAN  See Patient Instructions for co-developed, patient-stated behavior change goals.  AVS printed and provided to patient today. See Follow-Up section for recommended follow-up.    Time Spent: 61 minutes  Encounter Type: Individual    Any diabetes medication dose changes were made via the CDE Protocol and Collaborative Practice Agreement with the patient's referring provider. A copy of this encounter was shared with the provider.      Elzbieta Pollock RD [facility]  [phone]       Imiquimod Counseling:  I discussed with the patient the risks of imiquimod including but not limited to erythema, scaling, itching, weeping, crusting, and pain.  Patient understands that the inflammatory response to imiquimod is variable from person to person and was educated regarded proper titration schedule.  If flu-like symptoms develop, patient knows to discontinue the medication and contact us.

## 2023-04-29 ENCOUNTER — HEALTH MAINTENANCE LETTER (OUTPATIENT)
Age: 48
End: 2023-04-29

## 2023-07-22 ENCOUNTER — HEALTH MAINTENANCE LETTER (OUTPATIENT)
Age: 48
End: 2023-07-22

## 2023-09-30 ENCOUNTER — HEALTH MAINTENANCE LETTER (OUTPATIENT)
Age: 48
End: 2023-09-30

## 2023-12-09 ENCOUNTER — HEALTH MAINTENANCE LETTER (OUTPATIENT)
Age: 48
End: 2023-12-09

## 2024-02-17 ENCOUNTER — HEALTH MAINTENANCE LETTER (OUTPATIENT)
Age: 49
End: 2024-02-17

## 2024-02-19 ENCOUNTER — ANCILLARY PROCEDURE (OUTPATIENT)
Dept: MAMMOGRAPHY | Facility: CLINIC | Age: 49
End: 2024-02-19
Attending: FAMILY MEDICINE
Payer: COMMERCIAL

## 2024-02-19 DIAGNOSIS — Z12.31 VISIT FOR SCREENING MAMMOGRAM: ICD-10-CM

## 2024-02-19 PROCEDURE — 77067 SCR MAMMO BI INCL CAD: CPT | Mod: TC | Performed by: RADIOLOGY

## 2024-02-19 PROCEDURE — 77063 BREAST TOMOSYNTHESIS BI: CPT | Mod: TC | Performed by: RADIOLOGY

## 2024-05-01 NOTE — PROGRESS NOTES
"Raf is a 48 year old who is being evaluated via a billable video visit.      The patient has been notified of following:     \"This video visit will be conducted via a call between you and your physician/provider. We have found that certain health care needs can be provided without the need for an in-person physical exam.  This service lets us provide the care you need with a video conversation.  If a prescription is necessary we can send it directly to your pharmacy.  If lab work is needed we can place an order for that and you can then stop by our lab to have the test done at a later time.    Video visits are billed at different rates depending on your insurance coverage.  Please reach out to your insurance provider with any questions.    If during the course of the call the physician/provider feels a video visit is not appropriate, you will not be charged for this service.\"    Patient has given verbal consent for Video visit? Yes    How would you like to obtain your AVS? MyChart    If the video visit is dropped, the invitation should be resent by: Text to cell phone: [phone]    Will anyone else be joining your video visit? No    I    Video-Visit Details    Type of service:  Video Visit    Originating Location (pt. Location): Home    Distant Location (provider location):   Off-Site - Provider Home Office    Platform used for Video Visit: Victiv    Video Start Time:  3:02    Video End Time: 3:33        MEDICAL WEIGHT LOSS INITIAL EVALUATION  Patient accompanied by:self  DIAGNOSIS:  Class I Obesity    NUTRITION HISTORY:    5/10/2024     2:06 PM    Diet Recall Review with Patient   If you do eat breakfast, what types of food do you eat? Egg/ cheese biscuit  (fast food or frozen) @ 7:30 am or later on weekends    If you do eat lunch, what types of food do you typically eat? Ham  or chicken sandwich and chips or yogurt, diet coke @ 11:50 am   If you do eat supper, what types of food do you typically eat? protein, " carb, ramilastephanie @ 7-8 pm   If you do snack, what types of food do you typically eat? crackers, granola bars, popcorn, sweets-afternoon and before bed   How many glasses of juice do you drink in a typical day? 0   How many of glasses of milk do you drink in a typical day? 0   How many 8oz glasses of sugar containing drinks such as Jamel-Aid/sweet tea do you drink in a day? 0   How many cans/bottles of sugar pop/soda/tea/sports drinks do you drink in a day? 0   How many cans/bottles of diet pop/soda/tea or sports drink do you drink in a day? 2   How often do you have a drink of alcohol? 2-4 Times a Month   If you do drink, how many drinks might you have in a day? 3-4-light beer       50-60 oz water, Diet coke, iced tea/ no sugar, occasional coffee         5/10/2024     2:06 PM   Eating Habits   Generally, my meals include foods like these bread, pasta, rice, potatoes, corn, crackers, sweet dessert, pop, or juice A Few Times a Week   Generally, my meals include foods like these fried meats, brats, burgers, french fries, pizza, cheese, chips, or ice cream A Few Times a Week   Eat fast food (like [employer], [employer], [employer]aco [employer]) A Few Times a Week   Eat at a buffet or sit-down restaurant A Few Times a Week   Eat most of my meals in front of the TV or computer A Few Times a Week   Often skip meals, eat at random times, have no regular eating times Less Than Weekly   Rarely sit down for a meal but snack or graze throughout Less Than Weekly   Eat extra snacks between meals A Few Times a Week   Eat most of my food at the end of the day Less Than Weekly   Eat in the middle of the night or wake up at night to eat Never   Eat extra snacks to prevent or correct low blood sugar Everyday   Eat to prevent acid reflux or stomach pain Never   Worry about not having enough food to eat Never   I eat when I am depressed A Few Times a Week   I eat when I am stressed A Few Times a Week   I eat when I am bored A Few Times a Week   I eat when  I am anxious A Few Times a Week   I eat when I am happy or as a reward A Few Times a Week   I feel hungry all the time even if I just have eaten Never   Feeling full is important to me Everyday   I finish all the food on my plate even if I am already full Less Than Weekly   I can't resist eating delicious food or walk past the good food/smell A Few Times a Week   I eat/snack without noticing that I am eating Never   I eat when I am preparing the meal Once a Week   I eat more than usual when I see others eating Once a Week   I have trouble not eating sweets, ice cream, cookies, or chips if they are around the house A Few Times a Week   I think about food all day Never   What foods, if any, do you crave? Sweets/Candy/Chocolate              5/10/2024     2:06 PM   Amount of Food   I feel out of control when eating Never   I eat a large amount of food, like a loaf of bread, a box of cookies, a pint/quart of ice cream, all at once Never   I eat a large amount of food even when I am not hungry Never   I eat rapidly Monthly   I eat alone because I feel embarrassed and do not want others to see how much I have eaten Never   I eat until I am uncomfortably full Never   I feel bad, disgusted, or guilty after I overeat Monthly              5/10/2024     2:06 PM   Activity/Exercise History   How much of a typical 12 hour day do you spend sitting? Less Than Half the Day   How much of a typical 12 hour day do you spend lying down? Less Than Half the Day   How much of a typical day do you spend walking/standing? Half the Day   How many hours (not including work) do you spend on the TV/Video Games/Computer/Tablet/Phone? 4-5 Hours   How many times a week are you active for the purpose of exercise? Never   What keeps you from being more active? Lack of Time    Too tired    Other   How many total minutes do you spend doing some activity for the purpose of exercising when you exercise? None         ADDITIONAL INFORMATION: Patient was  "diagnosed with T2 DM in 2021 and worked hard to make behavior changes. Has had a very stressful year due to mom passing away and gained back ~ 20 lb. Patient shops for food and spouse does most of the cooking. Works as an  and will be off over the summer. Has food allergy to turkey. Not doing home BGM.      ANTHROPOMETRICS:  Height: 67\"   Weight: 200 lb  patient reported   BMI:  31.32 kg/m2  NUTRITION DIAGNOSIS:   Obese class I related to overeating and poor lifestyle habits as evidence by patient's subjective statements and  BMI of 31.32 kg/m2   NUTRITION INTERVENTIONS  Nutrition Prescription:  Recommend modified energy- nutrient intake  Implementation:  Nutrition Education (Content):  Discussed plate guidelines    Determined alternative to emotional eating  Reviewed healthy snacking and beverage choices  Provided: Building a Healthy Relationship with Food, Plate Guidelines, Protein Sources for Weight Loss,     Nutrition Education (Application):   Patient to practice goals as stated below  Patient verbalizes understanding of diet by wanting to get on track with exercise  Anticipate good compliance    Goals:  Schedule exercise (walk, bike, hike) 3-4X per week for 15 minutes  Focus on alternatives to emotional eating (painting, crafts, exercise, organizing/ minimizing)  Aim to have 1/2 or plate non-starch veggies, 1/4 protein, 1/4 starch/ fruit (aim to have no more than 45-60 grams of carbohydrate at meals)      FOLLOW UP AND MONITORING:    Other  - follow up in 4-6 weeks.     TIME SPENT WITH PATIENT:   30 minutes   Wil Morrison RD, LD  [facility], [place]   "

## 2024-05-02 NOTE — PROGRESS NOTES
"Raf is a 48 year old who is being evaluated via a billable video visit.      The patient has been notified of following:     \"This video visit will be conducted via a call between you and your physician/provider. We have found that certain health care needs can be provided without the need for an in-person physical exam.  This service lets us provide the care you need with a video conversation.  If a prescription is necessary we can send it directly to your pharmacy.  If lab work is needed we can place an order for that and you can then stop by our lab to have the test done at a later time.    Video visits are billed at different rates depending on your insurance coverage.  Please reach out to your insurance provider with any questions.    If during the course of the call the physician/provider feels a video visit is not appropriate, you will not be charged for this service.\"    Patient has given verbal consent for Video visit? No    How would you like to obtain your AVS? MyChart    If the video visit is dropped, the invitation should be resent by: Text to cell phone: [phone]    Will anyone else be joining your video visit? No    I    Video-Visit Details    Type of service:  Video Visit    Originating Location (pt. Location): Home in [place]    Distant Location (provider location):   [facility]    Platform used for Video Visit: Powerspan    Video Start Time: 2:09 PM    Video End Time:3:01 PM          New Medical Weight Management Consult    PATIENT:  Any Jackson   MRN:         [id number]   :         1975  ANUSHA:         5/10/2024      Dear Nely Murphy,    I had the pleasure of seeing your patient, Any Jackson. Full intake/assessment was done to determine barriers to weight loss success and develop a treatment plan. Any Jackson is a 48 year old female interested in treatment of medical problems associated with excess weight. She has " "a height of 5' 7\", a weight of 200 lbs 0 oz, and the calculated Body mass index is 31.32 kg/m .    Assessment & Plan   Problem List Items Addressed This Visit       Type 2 diabetes, HbA1c goal < 7% (H)    Relevant Medications    semaglutide (OZEMPIC) 2 MG/3ML pen    Semaglutide, 1 MG/DOSE, (OZEMPIC) 4 MG/3ML pen    Other Relevant Orders    Basic metabolic panel    Hemoglobin A1c    Class 1 obesity with serious comorbidity and body mass index (BMI) of 31.0 to 31.9 in adult, unspecified obesity type - Primary     Initial MWM. Reviewed baseline labs during visit from [facility]. Vitamin D still needed. Plan to start Ozempic. Recommend review with PCP about continuing metformin or stopping. Discussed repeat BMP and hgA1c in 2-3 months - she will likely be getting labs with [facility] so will order now in case we can fax those orders over.          Relevant Medications    semaglutide (OZEMPIC) 2 MG/3ML pen    Semaglutide, 1 MG/DOSE, (OZEMPIC) 4 MG/3ML pen    Other Relevant Orders    Hemoglobin A1c [LAB90] (Future)    Vitamin D Deficiency [ZDT142] (Future)    Nutrition Referral    Basic metabolic panel    Hemoglobin A1c        PROGRAM OVERVIEW  Reviewed options at [facility].   All questions were answered. Education provided on chronic disease management of obesity.      MEDICATIONS:  We discussed healthy habits to assist with weight loss. We reviewed medications associated with weight gain. We discussed the role of pharmacological agents in the treatment of obesity and the \"off-label\" use of medications in this practice. We reviewed medication that may assist with weight loss. Indications, contraindications, risks/benefits, and potential side effects were discussed.   Ozempic was prescribed. Discussed mechanism of action, common side effects, titration guidelines, and  discussed risks for pancreatitis/gallbladder problems/gastroparesis/low sugars/discussed DM retinopath risk/MTC/MEN2. Medication " handout given in AVS. Discussed that medications must always be used together with lifestyle changes. Reviewed rationale for long term use of pharmacotherapy in chronic disease management for obesity.      AOM Considerations:  Phentermine: SSRI consideration with Lexapro Anxiety:   Yes  Topiramate: CNS depressant with Lexapro   GLP-1:  Monitor sugars, history of type 2 diabetes,   Naltrexone: No interaction seen   Wellbutrin: Endometrial response to Lexapro Anxiety:   Yes   Metformin: using right now - has stomach issues.    Contrave:  Qsymia:             PATIENT INSTRUCTIONS:  Pt Instructions:  Labs ordered (vitamin D).  Please call [phone] to set up a lab appt. I would recommend a repeat BMP and hgA1c 3 months after starting Ozempic - these are ordered in our system. If you would like them drawn w/[facility] please send us a MyChart with the lab fax number for [facility] and we can fax our orders there.   Start Ozempic  Directions: Inject 0.25 mg once weekly for 4 weeks, then increase to 0.5 mg weekly for 4 weeks, then increase to 1 mg weekly.    If you get your medication pen and have questions about doing the injection - call or Mychart our office for the nurses to set up a virtual teaching session with you.      May use famotidine 20 mg BID as needed for nausea/heartburn when starting the medication.   3. Discuss with your primary care     Goals:  I recommend eating breakfast within an hour of waking up and eating every 4-6 hours during the day and try minimize snacking between meals. Prioritizing veggies and proteins for food choices is also recommended as medically appropriate.  Recommend 64oz (2L) water daily as medically appropriate (6-8 glasses)  Recommend pursing regular exercise. 5 minutes of exercise every other day or every 2 days is a great place to start with aiming for 30 minutes 5 times a week as an end goal.  If you can, try to get some strength training twice weekly while losing  "weight to help preserve your muscle!          Follow up:    Call [phone] to schedule next visit in 3-4 months    58 minutes spent on the date of the encounter doing chart review, history and exam, review test results, counseling, developing plan of care, documentation, and further activities as noted above.      She has the following co-morbidities:        5/10/2024     2:06 PM   --   I have the following health issues associated with obesity Type II Diabetes    High Cholesterol    Stress Incontinence   I have the following symptoms associated with obesity Irregular Menstral Cycle         5/10/2024     2:06 PM   Referring Provider   Please name the provider who referred you to Medical Weight Management  If you do not know, please answer \"I Don't Know\" [facility]     2021 dx w/DM II and taking metformin 4 pills daily since then - gives her stomach aches. Interested in other medications as well as dietary changes. Having Sx for stress incontinence and working with a gynecology for that as well.  Really inhibits exercising as well.         5/10/2024     2:06 PM   Weight History   How concerned are you about your weight? Somewhat Concerned   I became overweight As an Adult   The following factors have contributed to my weight gain Mental Health Issues    Started on Medication that Caused Weight Gain    Eating Wrong Types of Food    Eating Too Much    Lack of Exercise    Genetic (Runs in the Family)    Stress   I have tried the following methods to lose weight Watching Portions or Calories    Exercise    Atkins-type Diet (Low Carb/High Protein)   My lowest weight since age 20 was 150   My highest weight since age 20 was 210   The most weight I have ever lost was (lbs) 20   I have the following family history of obesity/being overweight My father is overweight    One or more of my siblings are overweight    Many of my relatives are overweight   How has your weight changed over the last year? Gained   How " many pounds? 20           5/10/2024     2:06 PM   Diet Recall Review with Patient   If you do eat breakfast, what types of food do you eat? biscuit egg   If you do eat lunch, what types of food do you typically eat? sandwich and chips diet coke   If you do eat supper, what types of food do you typically eat? protein, carb, veggie   If you do snack, what types of food do you typically eat? crackers, granola bars, popcorn, sweets   How many glasses of juice do you drink in a typical day? 0   How many of glasses of milk do you drink in a typical day? 0   How many 8oz glasses of sugar containing drinks such as Jamel-Aid/sweet tea do you drink in a day? 0   How many cans/bottles of sugar pop/soda/tea/sports drinks do you drink in a day? 0   How many cans/bottles of diet pop/soda/tea or sports drink do you drink in a day? 2   How often do you have a drink of alcohol? 2-4 Times a Month   If you do drink, how many drinks might you have in a day? 3-4   Meals: doesn't skip meals.    Water: one alan a day         5/10/2024     2:06 PM   Eating Habits   Generally, my meals include foods like these bread, pasta, rice, potatoes, corn, crackers, sweet dessert, pop, or juice A Few Times a Week   Generally, my meals include foods like these fried meats, brats, burgers, french fries, pizza, cheese, chips, or ice cream A Few Times a Week   Eat fast food (like [employer], [employer], [employer]) A Few Times a Week   Eat at a buffet or sit-down restaurant A Few Times a Week   Eat most of my meals in front of the TV or computer A Few Times a Week   Often skip meals, eat at random times, have no regular eating times Less Than Weekly   Rarely sit down for a meal but snack or graze throughout Less Than Weekly   Eat extra snacks between meals A Few Times a Week   Eat most of my food at the end of the day Less Than Weekly   Eat in the middle of the night or wake up at night to eat Never   Eat extra snacks to prevent or correct low blood  sugar Everyday   Eat to prevent acid reflux or stomach pain Never   Worry about not having enough food to eat Never   I eat when I am depressed A Few Times a Week   I eat when I am stressed A Few Times a Week   I eat when I am bored A Few Times a Week   I eat when I am anxious A Few Times a Week   I eat when I am happy or as a reward A Few Times a Week   I feel hungry all the time even if I just have eaten Never   Feeling full is important to me Everyday   I finish all the food on my plate even if I am already full Less Than Weekly   I can't resist eating delicious food or walk past the good food/smell A Few Times a Week   I eat/snack without noticing that I am eating Never   I eat when I am preparing the meal Once a Week   I eat more than usual when I see others eating Once a Week   I have trouble not eating sweets, ice cream, cookies, or chips if they are around the house A Few Times a Week   I think about food all day Never   What foods, if any, do you crave? Sweets/Candy/Chocolate           5/10/2024     2:06 PM   Amount of Food   I feel out of control when eating Never   I eat a large amount of food, like a loaf of bread, a box of cookies, a pint/quart of ice cream, all at once Never   I eat a large amount of food even when I am not hungry Never   I eat rapidly Monthly   I eat alone because I feel embarrassed and do not want others to see how much I have eaten Never   I eat until I am uncomfortably full Never   I feel bad, disgusted, or guilty after I overeat Monthly       I make myself vomit what I have eaten or use laxatives to get rid of food. Never           5/10/2024     2:06 PM   Activity/Exercise History   How much of a typical 12 hour day do you spend sitting? Less Than Half the Day   How much of a typical 12 hour day do you spend lying down? Less Than Half the Day   How much of a typical day do you spend walking/standing? Half the Day   How many hours (not including work) do you spend on the TV/Video  Games/Computer/Tablet/Phone? 4-5 Hours   How many times a week are you active for the purpose of exercise? Never   What keeps you from being more active? Lack of Time    Too tired    Other   How many total minutes do you spend doing some activity for the purpose of exercising when you exercise? None     Previously generally was very active hiking, bike riding, walking - all can cause issues /stress incontinence    Mom recently passed in March (was care giver)    PAST MEDICAL HISTORY:  Past Medical History:   Diagnosis Date    Anemia     Diabetes mellitus (H)            5/10/2024     2:06 PM   Work/Social History Reviewed With Patient   My employment status is Full-Time   My job is    How much of your job is spent on the computer or phone? Less Than 50%   How many hours do you spend commuting to work daily? 10 minutes   What is your marital status? /In a Relationship   If in a relationship, is your significant other overweight? Yes   If you have children, are they overweight? No   Who do you live with?  Kendell, dog Giulia, daughter Genet, daughter Antonette   Who does the food shopping? me       Social History     Tobacco Use    Smoking status: Former     Types: Cigarettes   Substance Use Topics    Alcohol use: Yes    Drug use: No            5/10/2024     2:06 PM   Mental Health History Reviewed With Patient   Have you ever been physically or sexually abused? No   How often in the past 2 weeks have you felt little interest or pleasure in doing things? Not at all   Over the past 2 weeks how often have you felt down, depressed, or hopeless? For Several Days     Has family/friends - no interest.         5/10/2024     2:06 PM   Questions Reviewed With Patient   How ready are you to make changes regarding your weight? Number 1 = Not ready at all to make changes up to 10 = very ready. 10   How confident are you that you can change? 1 = Not confident that you will be successful making changes up to 10 = very  confident. 10           5/10/2024     2:06 PM   Sleep History Reviewed With Patient   How many hours do you sleep at night? 6     Do you SNORE loudly (louder than talking or loud enough to be heard through closed doors)? No   Do you often feel TIRED, fatigued, or sleepy during daytime? Yes   Has anyone OBSERVED you stop breathing during your sleep? No   Do you have or are you being treated for high blood PRESSURE? No   BMI more than 35kg/m2? No   AGE over 50 years old? No   NECK circumference > 16 inches (40cm)? No   GENDER: Male? No   STOP-BANG Total Score (range: 0 - 8) 2       MEDICATIONS:   Current Outpatient Medications   Medication Sig Dispense Refill    semaglutide (OZEMPIC) 2 MG/3ML pen Inject 0.25 mg Sub Q once weekly for 28 days, then 0.5 mg weekly for 28 days. 3 mL 1    Semaglutide, 1 MG/DOSE, (OZEMPIC) 4 MG/3ML pen Inject 1 mg Subcutaneous once a week 9 mL 0    atorvastatin (LIPITOR) 10 MG tablet Take 10 mg by mouth daily      escitalopram (LEXAPRO) 10 MG tablet Take 1 tablet by mouth daily      metFORMIN (GLUCOPHAGE-XR) 500 MG 24 hr tablet Take 1,000 mg by mouth 2 times daily (with meals)          ALLERGIES:   No Known Allergies    ROS:    HEENT  H/O glaucoma:  no  Cardiovascular  CAD:   no  Palpitations:   no  HTN:    no  Gastrointestinal  GERD:   no  Constipation:   No  Gastroparesis:  No  Liver Dz:   no  H/O Pancreatitis:  no  H/O Gallbladder Dz: no  Psychiatric  Anxiety:   yes  Depression:  no  Bipolar:  no  H/O ETOH/Drug abuse: No, prior smoker   H/O eating disorder: no  Endocrine  PMH/FMH of MTC or MEN2:  no  Neurologic:  H/O seizures:   no  Headaches:  occasionally  Memory Impairment:  no    H/O kidney stones:  no  Kidney disease:  no  Current birth control:  No birth control - told mark-menopause - irregular cycle     LABS/RECORDS REVIEWED:  Labs reviewed in Epic    Labs reviewed on phone visit from [facility].     11/16/2023:  TSH: 1.73  hgA1c 6.3  CMP: Glucose 124 ow WNL, Cr 0.65, eGFR  "110 ml min 1.73 so >60    8/2022: CBC WNL    Updated labs needed  12/14/2021 hemoglobin A1c normal, improved from 2 years prior    4/28/2021 hgA1c 11.8 - now usually under 7       BP Readings from Last 6 Encounters:   07/29/21 130/85   04/29/21 (!) 140/85   06/10/20 (!) 165/112   10/21/17 137/89   03/02/13 119/70       Pulse Readings from Last 6 Encounters:   04/29/21 85   03/02/13 92       PHYSICAL EXAM:  Ht 5' 7\" (1.702 m)   Wt 200 lb (90.7 kg)   BMI 31.32 kg/m    GENERAL: Healthy, alert and no distress  EYES: Eyes grossly normal to inspection.    RESP: No audible wheeze, cough, or visible cyanosis.  No increased work of breathing.    SKIN: Visible skin clear. No significant rash, abnormal pigmentation or lesions.  NEURO: Mentation and speech appropriate for age.  PSYCH: Mentation appears normal, affect normal/bright, judgement and insight intact, normal speech and appearance well-groomed.      COUNSELING:   Reviewed obesity as a chronic disease and comprehensive management stratagies.      We discussed Bariatric Basics including:  -eating 3 meals daily  -eating protein first  -eating slowly, chewing food well  -avoiding/limiting calorie containing beverages  -limiting carbohydrates and changing to whole grains  -limiting restaurant or cafeteria eating to twice a week or less    We discussed the importance of restorative sleep and stress management in maintaining a healthy weight.  We discussed insulin resistance and glycemic index as it relates to appetite and weight control.   We discussed the importance of physical activity including cardiovascular and strength training in maintaining a healthier weight and explored viable options.  Patient education of above written in AVS.      Sincerely,    Lore Abreu PA-C        "

## 2024-05-10 ENCOUNTER — VIRTUAL VISIT (OUTPATIENT)
Dept: SURGERY | Facility: CLINIC | Age: 49
End: 2024-05-10
Payer: COMMERCIAL

## 2024-05-10 ENCOUNTER — TELEPHONE (OUTPATIENT)
Dept: SURGERY | Facility: CLINIC | Age: 49
End: 2024-05-10

## 2024-05-10 VITALS — BODY MASS INDEX: 31.39 KG/M2 | HEIGHT: 67 IN | WEIGHT: 200 LBS

## 2024-05-10 DIAGNOSIS — E11.9 TYPE 2 DIABETES, HBA1C GOAL < 7% (H): ICD-10-CM

## 2024-05-10 DIAGNOSIS — E66.811 CLASS 1 OBESITY WITH SERIOUS COMORBIDITY AND BODY MASS INDEX (BMI) OF 31.0 TO 31.9 IN ADULT, UNSPECIFIED OBESITY TYPE: Primary | ICD-10-CM

## 2024-05-10 PROCEDURE — 99204 OFFICE O/P NEW MOD 45 MIN: CPT | Mod: 95 | Performed by: PHYSICIAN ASSISTANT

## 2024-05-10 PROCEDURE — 97802 MEDICAL NUTRITION INDIV IN: CPT | Mod: 95

## 2024-05-10 ASSESSMENT — SLEEP AND FATIGUE QUESTIONNAIRES
HOW LIKELY ARE YOU TO NOD OFF OR FALL ASLEEP WHILE WATCHING TV: SLIGHT CHANCE OF DOZING
HOW LIKELY ARE YOU TO NOD OFF OR FALL ASLEEP WHILE SITTING INACTIVE IN A PUBLIC PLACE: WOULD NEVER DOZE
HOW LIKELY ARE YOU TO NOD OFF OR FALL ASLEEP WHEN YOU ARE A PASSENGER IN A CAR FOR AN HOUR WITHOUT A BREAK: WOULD NEVER DOZE
HOW LIKELY ARE YOU TO NOD OFF OR FALL ASLEEP WHILE SITTING QUIETLY AFTER LUNCH WITHOUT ALCOHOL: WOULD NEVER DOZE
HOW LIKELY ARE YOU TO NOD OFF OR FALL ASLEEP WHILE SITTING AND READING: SLIGHT CHANCE OF DOZING
HOW LIKELY ARE YOU TO NOD OFF OR FALL ASLEEP WHILE SITTING AND TALKING TO SOMEONE: WOULD NEVER DOZE
HOW LIKELY ARE YOU TO NOD OFF OR FALL ASLEEP IN A CAR, WHILE STOPPED FOR A FEW MINUTES IN TRAFFIC: WOULD NEVER DOZE
HOW LIKELY ARE YOU TO NOD OFF OR FALL ASLEEP WHEN YOU ARE A PASSENGER IN A CAR FOR AN HOUR WITHOUT A BREAK: WOULD NEVER DOZE
HOW LIKELY ARE YOU TO NOD OFF OR FALL ASLEEP WHILE WATCHING TV: SLIGHT CHANCE OF DOZING
HOW LIKELY ARE YOU TO NOD OFF OR FALL ASLEEP WHILE SITTING AND READING: SLIGHT CHANCE OF DOZING
HOW LIKELY ARE YOU TO NOD OFF OR FALL ASLEEP IN A CAR, WHILE STOPPED FOR A FEW MINUTES IN TRAFFIC: WOULD NEVER DOZE
HOW LIKELY ARE YOU TO NOD OFF OR FALL ASLEEP WHILE LYING DOWN TO REST IN THE AFTERNOON WHEN CIRCUMSTANCES PERMIT: SLIGHT CHANCE OF DOZING
HOW LIKELY ARE YOU TO NOD OFF OR FALL ASLEEP WHILE SITTING AND TALKING TO SOMEONE: WOULD NEVER DOZE
HOW LIKELY ARE YOU TO NOD OFF OR FALL ASLEEP WHILE SITTING QUIETLY AFTER LUNCH WITHOUT ALCOHOL: WOULD NEVER DOZE
HOW LIKELY ARE YOU TO NOD OFF OR FALL ASLEEP WHILE SITTING INACTIVE IN A PUBLIC PLACE: WOULD NEVER DOZE
HOW LIKELY ARE YOU TO NOD OFF OR FALL ASLEEP WHILE LYING DOWN TO REST IN THE AFTERNOON WHEN CIRCUMSTANCES PERMIT: SLIGHT CHANCE OF DOZING

## 2024-05-10 NOTE — PATIENT INSTRUCTIONS
Nice to talk with you today. Below is the plan discussed.-  Lore Abrue PA-C     Pt Instructions:  Labs ordered (vitamin D).  Please call [phone] to set up a lab appt. I would recommend a repeat BMP and hgA1c 3 months after starting Ozempic - these are ordered in our system. If you would like them drawn w/[facility] please send us a MyChart with the lab fax number for [facility] and we can fax our orders there.   Start Ozempic  Directions: Inject 0.25 mg once weekly for 4 weeks, then increase to 0.5 mg weekly for 4 weeks, then increase to 1 mg weekly.    If you get your medication pen and have questions about doing the injection - call or Mychart our office for the nurses to set up a virtual teaching session with you.      May use famotidine 20 mg BID as needed for nausea/heartburn when starting the medication.   3. Discuss with your primary care     Goals:  I recommend eating breakfast within an hour of waking up and eating every 4-6 hours during the day and try minimize snacking between meals. Prioritizing veggies and proteins for food choices is also recommended as medically appropriate.  Recommend 64oz (2L) water daily as medically appropriate (6-8 glasses)  Recommend pursing regular exercise. 5 minutes of exercise every other day or every 2 days is a great place to start with aiming for 30 minutes 5 times a week as an end goal.  If you can, try to get some strength training twice weekly while losing weight to help preserve your muscle!          Follow up:    Call [phone] to schedule next visit in 3-4 months    Semaglutide (Ozempic)    We are starting Semaglutide (Ozempic) a GLP-1 (Glucagon-like Peptide-1) medication. It can help you lose weight and control your blood sugar. One of the ways it works is by slowing down the rate that food leaves your stomach. You feel hoyt and will eat less.  It also helps regulate hormones that can help improve your blood sugars.    Dosing Instructions:  Starting dose is 0.25 mg once weekly for 4 weeks, and then increase to 0.5 mg weekly. If after 4 weeks of being on 0.5 mg weekly dosing and still wanting additional weight loss and/or blood sugar benefits, check with your doctor to see if an increase is appropriate. Pen needles come in the Ozempic pen box  Side effects of GLP- Medications include: The most common side effects are all GI related and consist of: nausea, constipation, diarrhea, burping, or gassiness. Patients are advised to eat slowly and less, and nausea typically passes if people can stick it out.     The risk of pancreatitis (inflammation of the pancreas) has been associated with this type of medication, but is very rare.  If you have had pancreatitis in the past, this medication may not be for you. Please let us know about any past history of pancreas problems.      Symptoms of pancreatitis include: Pain in your upper stomach area which  may travel to your back and be worse after eating. Your stomach area may be tender to the touch.  You may have vomiting or nausea and/or have a fever. If you should develop any of these symptoms, stop the medication and contact your primary care doctor. They will do a blood test to check for pancreatitis.       For any questions/concerns contact [facility] at [phone]    (Do not stop taking it if you don't think it's working. For some people it works without them knowing it.)       There is a small chance you may have some low blood sugar after taking the medication.   The signs of low blood sugar are:  Weakness  Shaky   Hungry  Sweating  Confusion      See below for ways to treat low blood sugar without adding in lots of extra calories.      Treating Low Blood Sugar    If you have symptoms of low blood sugar (sweating, shaking, dizzy, confused) eat 15 grams of carbs and wait 15 minutes:    Glucose Tabs are best for sugars under 70 -  Dex4 or BD Glucose tablets are good, you will need to take  3-4 of these to equal 15 grams.     One small box of raisins  4 oz fruit juice box or   cup fruit juice  1 small apple  1 small banana    cup canned fruit in water    English muffin or a slice of bread with jelly   1 low fat frozen waffle with sugar-free syrup    cup cottage cheese with   cup frozen or fresh blueberries  1 cup skim or low-fat milk    cup whole grain cereal  4-6 crackers such as Triscuits      This medication is usually not covered by insurance and can be quite expensive. Sometimes a prior authorization is required, which may take up to 1-2 weeks for an insurance company to make a decision if they will cover the medication. Please be patient, you will be notified after a decision has been made.    Contact the nurse via [employer] or call [phone] if you have any questions or concerns. (Do not stop taking it if you don't think it's working. For some people it works without them knowing it.)     In order to get refills of this or any medication we prescribe you must be seen in the medical weight mgmt clinic every 2-4 months.    Using Your Ozempic Pen     Medicine: (Semaglutide)          Storing your pens     Store your pens in the refrigerator until you use them. Don t let them freeze. When your pen is in use, you can keep it at room temperature or continue to keep it in the refrigerator for 56 days. After that, throw it away.       Get your pen ready (before first use only):     Wash and dry your hands well.     Remove the pen cap.     Look at the window in the pen to make sure the liquid is clear and has no color or specks.     Do not use the pen if it is not clear, has a color or you can see specks in it.     It is normal to see air bubbles.     Remove the seal from the new pen needle and carefully screw it onto the end of the pen.     Remove the outer needle cap and set it aside. Remove the inner needle cap and throw it away.     Turn the knob on the pen until you see -- in the dose window.      Hold the pen with the needle pointing straight up. Gently tap the side of the pen to get rid of any air bubbles.     Push the injection button until you see 0mg in the dose window. You should see a drop of liquid at the end of the needle. This means your pen is ready to use.     Repeat steps 7 and 8 one or two more times if you need to, until you see a drop of medicine.     Inject your dose:     Wash and dry your hands well.     Remove the pen cap.     Look at the window to make sure the medicine is clear and has no color or specks.     Do not use the pen if it is not clear or has a color or you can see specks.     It is normal to see air bubbles.     Remove the seal from the new pen needle and screw it onto the end of the pen.     Remove the outer needle cap and set it aside. Remove the inner needle cap and throw it away.     Turn the knob away from you until you see the number for your dose in the window.     Use an alcohol swab to clean your skin (remember to change injection sites).     Insert the needle straight into your skin so that it reaches the fatty layer.     Use your thumb to slowly press the button on the end of the pen until you see 0mg. Hold it for 6 seconds to allow time for the medicine to get into your body.     Release the button and pull the needle out.     Put the needle cap on the end of the pen and unscrew the needle from the pen. Put the used needle into a SHARPS container.     Put the cap on the pen and store at room temperature, away from sunlight.     If you have questions about using your pen, please ask your pharmacist or doctor.                               LEAN PROTEIN SOURCES      Protein Source Portion Calories Grams of Protein                           Nonfat, plain Greek yogurt    (10 grams sugar or less) 3/4 cup (6 oz)  12-17   Light Yogurt (10 grams sugar or less) 3/4 cup (6 oz)  6-8   Protein Shake 1 shake 110-180 15-30   Skim/1% Milk or lactose-free milk 1 cup  ( 8 oz)  8   Plain or light, flavored soymilk 1 cup  7-8   Plain or light, hemp milk 1 cup 110 6   Fat Free or 1% Cottage Cheese 1/2 cup 90 15   Part skim ricotta cheese 1/2 cup 100 14   Part skim or reduced fat cheese slices 1 ounce 65-80 8     Mozzarella String Cheese 1 80 8   Canned tuna, chicken, crab or salmon  (canned in water)  1/2 cup 100 15-20   White fish (broiled, grilled, baked) 3 ounces 100 21   [place]/Tuna (broiled, grilled, baked) 3 ounces 150-180 21   Shrimp, Scallops, Lobster, Crab 3 ounces 100 21   Pork loin, Pork Tenderloin 3 ounces 150 21   Boneless, skinless chicken /turkey breast                          (broiled, grilled, baked) 3 ounces 120 21   [place], [place], [place], and Venison 3 ounces 120 21   Lean cuts of red meat and pork (sirloin,   round, tenderloin, flank, ground 93%-96%) 3 ounces 170 21   Lean or Extra Lean Ground Turkey 1/2 cup 150 20   90-95% Lean [place] Burger 1 brayan 140-180 21   Low-fat casserole with lean meat 3/4 cup 200 17   Luncheon Meats                                                        (turkey, lean ham, roast beef, chicken) 3 ounces 100 21   Egg (boiled, poached, scrambled) 1 Egg 60 7   Egg Substitute 1/2 cup 70 10   Nuts (limit to 1 serving per day)  3 Tbsp. 150 7   Nut [place] (peanut, almond)  Limit to 1 serving or less daily 1 Tbsp. 90 4   Soy Burger (varies) 1  15   Garbanzo, Black, Yip Beans 1/2 cup 110 7   Refried Beans 1/2 cup 100 7   Kidney and Lima beans 1/2 cup 110 7   Tempeh 3 oz 175 18   Vegan crumbles 1/2 cup 100 14   Tofu 1/2 cup 110 14   Chili (beans and extra lean beef or turkey) 1 cup 200 23   Lentil Stew/Soup 1 cup 150 12   Black Bean Soup 1 cup 175 12

## 2024-05-10 NOTE — ASSESSMENT & PLAN NOTE
Initial MWM. Reviewed baseline labs during visit from [facility]. Vitamin D still needed. Plan to start Ozempic. Recommend review with PCP about continuing metformin or stopping. Discussed repeat BMP and hgA1c in 2-3 months - she will likely be getting labs with [facility] so will order now in case we can fax those orders over.

## 2024-05-10 NOTE — TELEPHONE ENCOUNTER
Prior Authorization Retail Medication Request    Medication/Dose: semaglutide (OZEMPIC) 2 MG/3ML pen  Semaglutide, 1 MG/DOSE, (OZEMPIC) 4 MG/3ML pen  Diagnosis and ICD code (if different than what is on RX): [E66.9, Z68.31]   [E11.9]   New/renewal/insurance change PA/secondary ins. PA: NEW  Previously Tried and Failed:  metformin  Rationale:  wt loss    Insurance   Primary: [facility] CHOICE  Insurance ID:  [id number]      Pharmacy Information (if different than what is on RX)  [facility] [place] [facility]  Phone:  [phone]   Fax:[phone]

## 2024-05-10 NOTE — PATIENT INSTRUCTIONS
Andrea Carbone-  Welcome to the [facility], [place]! It was great to visit with you and learn about your interest in weight loss. Below are the goals we discussed.  Goals:  Schedule exercise (walk, bike, hike) 3-4X per week for 15 minutes  Focus on alternatives to emotional eating (painting, crafts, exercise, organizing/ minimizing)  Aim to have 1/2 or plate non-starch veggies, 1/4 protein, 1/4 starch/ fruit (aim to have no more than 45-60 grams of carbohydrate at meals)    Nutrition Educational Materials:  My Plate  Protein Sources for Weight Loss   Building a Healthy Relationship with Food     Please call [phone] to schedule your next visit with a Dietitian in 4-6 weeks.  Thanks!  Wil Morrison RD, LD  [facility], [place]

## 2024-05-11 NOTE — TELEPHONE ENCOUNTER
Prior Authorization Approval    Medication: SEMAGLUTIDE(0.25 OR 0.5MG/DOS) 2 MG/3ML SC SOPN  Authorization Effective Date: 5/10/2024  Authorization Expiration Date: 5/10/2025  Approved Dose/Quantity:   Reference #: [id number]  Insurance Company: [employer] - Phone [phone] Fax [phone]  Expected CoPay: $    CoPay Card Available:      Financial Assistance Needed:   Which [facility] filling the [id number] [address] [[id number]]   Pharmacy Notified: faxed approval to pharmacy  Patient Notified:  informed pharmacy to contact patient

## 2024-07-06 ENCOUNTER — HEALTH MAINTENANCE LETTER (OUTPATIENT)
Age: 49
End: 2024-07-06

## 2024-07-30 ENCOUNTER — TELEPHONE (OUTPATIENT)
Dept: SURGERY | Facility: CLINIC | Age: 49
End: 2024-07-30
Payer: COMMERCIAL

## 2024-07-30 NOTE — TELEPHONE ENCOUNTER
Called and left VM.    Informed pt that if she is tolerating at least 4 weeks of the 0.5 mg, she may increased to 1 mg weekly.    Instructed to call back if questions.    Eleonora KLEIN RN

## 2024-07-30 NOTE — TELEPHONE ENCOUNTER
Medication Question or Refill        What medication are you calling about (include dose and sig)?: Semaglutide, 1 MG/DOSE, (OZEMPIC) 4 MG/3ML pen     Preferred Pharmacy:   [place] [address] [place] [address]  [address] [facility] [address]  Phone: [phone] Fax: [phone]      Controlled Substance Agreement on file:   CSA -- Patient Level:    CSA: None found at the patient level.       Who prescribed the medication?: Lore Abreu    Do you need a refill? Yes    When did you use the medication last? Pt took 0.5 mg on 7/29/24    Patient offered an appointment? Yes: 8/21/24    Do you have any questions or concerns?  Yes: pt is asking about moving up to 1 mg dose. She took 0.5 mg dose on 7/29/24, she would like to know when she should take the 1 mg dose when she gets it.      Could we send this information to you in [employer] or would you prefer to receive a phone call?:   Patient would prefer a phone call   Okay to leave a detailed message?: Yes at Cell number on file:    Telephone Information:   Mobile [phone]

## 2024-07-31 ENCOUNTER — TELEPHONE (OUTPATIENT)
Dept: SURGERY | Facility: CLINIC | Age: 49
End: 2024-07-31
Payer: COMMERCIAL

## 2024-07-31 NOTE — TELEPHONE ENCOUNTER
General Call    Contacts       Contact Date/Time Type Contact Phone/Fax    07/31/2024 02:04 PM CDT Phone (Incoming) Raf Jackson (Self) [phone] (M)          Reason for Call:  Ozempic questions    What are your questions or concerns:  pt went to  prescription and it was over $900    Could we send this information to you in [facility] or would you prefer to receive a phone call?:   Patient would prefer a phone call   Okay to leave a detailed message?: Yes at Cell number on file:    Telephone Information:   Mobile [phone]

## 2024-07-31 NOTE — TELEPHONE ENCOUNTER
Called pharmacy to discuss price.    Per pharmacy, new price at check-out was $958.25 for 3 month supply.    This is with insurance . States a  coupon could be used, but would only bring down price $150.    Called and discussed with patient. Informed her that likely deductible has renewed and needs to spend it down.    She states she will call her insurance and investigate.    Next appt 8/21/24.    She will call back if she wants to discuss other options.    Eleonora KLEIN RN

## 2024-08-19 NOTE — PROGRESS NOTES
2024      Return Medical Weight Management Note     Any Jackson  MRN:  [id number]  :  1975    Assessment & Plan   Problem List Items Addressed This Visit       Type 2 diabetes, HbA1c goal < 7% (H)    Relevant Medications    tirzepatide (MOUNJARO) 2.5 MG/0.5ML pen    tirzepatide (MOUNJARO) 5 MG/0.5ML pen    Class 1 obesity with serious comorbidity and body mass index (BMI) of 31.0 to 31.9 in adult, unspecified obesity type - Primary     Healthy habits to assist with further weight loss were discussed. Raf doesn't feel she's had much benefit thus far with Ozempic and does have some side effects after starting so we will pivot and try for Mounjaro to see if better tolerated/more effective. She will get labs from primary care tomorrow as well as vitals/blood pressure. Declined BP today - briefly considered getting checked but then felt anxious so decided not to stay for it to be checked. Mao really only gets BP checks w/primary care and has an apt tomorrow           Relevant Medications    tirzepatide (MOUNJARO) 2.5 MG/0.5ML pen    tirzepatide (MOUNJARO) 5 MG/0.5ML pen      AOM Considerations:  Phentermine: SSRI consideration with Lexapro Anxiety:                  Yes  Topiramate: CNS depressant with Lexapro     GLP-1:             Monitor sugars, history of type 2 diabetes,   Naltrexone: No interaction seen           Wellbutrin: Endometrial response to Lexapro Anxiety:                      Yes        Metformin: using right now - has stomach issues.       Contrave:  Qsymia:         PATIENT INSTRUCTIONS:  Pt Instructions:  Labs ordered.  Please call [phone] to set up a lab appt to schedule/check those labs ordered from 5/10/24. Would recommend checking: hgA1c, BMP, and vitamin D at your primary care visit tomorrow. Let me know on MyChart what those results are or if you have the fax number for the lab there - we can fax the results.  Let's try to switch from Ozempic to Mounjaro. Continue  Ozempic until you get Moujaro.   Start Mounjaro:  You'll start at 2.5 mg once weekly for 4 weeks.   If you're tolerating it well, increase to 5 mg once weekly thereafter until I see you again.    You can take over the counter famotidine (Pepcid) 20 mg once or twice daily as needed for nausea/heartburn.    For reference:   Our clinic fax number: [phone]    Nursing line number: [phone]    Goals:  Continue to Focus on healthy food choices - prioritizing protein and veggies in your meals. I recommend eating every 4-6 hours to reduce the risk of low blood sugars and try to minimize snacking between meals. Stay well hydrated though the day as well.  Great job with exercising - please continue to invest in yourself with regular exercise. Continue to strive for a range for 150-300 minutes of exercise for weight maintenance and incorporating strength training twice a week to help preserve muscle!      Follow up:    Call [phone] to schedule next visit in next available. Be in touch on [employer] for refills/concerns between visits    28 minutes spent on the date of the encounter doing chart review, history and exam, result review, counseling, developing plan of care, documentation, and further activities as noted      INTERVAL HISTORY:  Raf returns for medical weight management follow up.  Last seen on 5/10/2024 by myself with plan at that time to start Ozempic    Labs ordered from 5/10/2024 not collected but note, check if recent labs from outside provider at time of visit. Will get labs tomorrow with primary care.    Fatigue/stomach nausea/loose stool - unsure if side effects are related to the Ozempic or metformin. Does now have upset stomach in the morning new since Ozempic - working to do split dosing for Metformin and noted better side effects. Doesn't check blood sugars any more - but discussing blood sugars in the morning. Not checking in the morning to know if sugars running high or low to cause side  effects.     Up to 1 mg dose currently - doesn't feel/notice a change with the new medication. Possibly some fatigue.     Loose stool has been persistent w/metformin. Will have 2-3 stools a day, but also days w/o any Bms.     Eating less   Good choices still   Walking more   Energy/motivation     Declines BP today - seeing PCP tomorrow. Has apt tomorrow.     Not much weight response - some reduced portions, thus far but reports not noting much benefit w/Ozempic so far and noting more GI side effects so discussed switching to Mounjaro    Reports really increasing hiking/walking. Does have surgery coming up in Sept for stress incontinence which may limit exercise ability for a while as well.    WEIGHT METRICS:  Body mass index is 30.54 kg/m .   Current Weight: 195 lb (88.5 kg)  Last Visits Weight: 195 lb (88.5 kg)  Initial Weight (lbs): 200 lbs  Cumulative weight loss (lbs): 5  Weight Loss Percentage: 2.5%    Wt Readings from Last 10 Encounters:   08/21/24 195 lb (88.5 kg)   05/10/24 200 lb (90.7 kg)   04/29/21 192 lb (87.1 kg)   06/10/20 198 lb (89.8 kg)   10/21/17 187 lb (84.8 kg)   02/27/13 205 lb (93 kg)                 8/21/2024     1:15 AM   Weight Loss Medication History Reviewed With Patient   Which weight loss medications are you currently taking on a regular basis? Ozempic   Are you having any side effects from the weight loss medication that we have prescribed you? Yes   If you are having side effects please describe: Fatigue/stomach nausea/loose stool           8/21/2024     1:15 AM   Changes and Difficulties   I have made the following changes to my diet since my last visit: Eating less   With regards to my diet, I am still struggling with: Good choices still   I have made the following changes to my activity/exercise since my last visit: Walking more   With regards to my activity/exercise, I am still struggling with: Energy/motivation       LABS:  Reviewed in Epic    Labs from 5/24 not drawn    BP  "Readings from Last 6 Encounters:   07/29/21 130/85   04/29/21 (!) 140/85   06/10/20 (!) 165/112   10/21/17 137/89   03/02/13 119/70       Pulse Readings from Last 6 Encounters:   08/21/24 87   04/29/21 85   03/02/13 92       PE:  Pulse 87   Resp 16   Ht 5' 7\" (1.702 m)   Wt 195 lb (88.5 kg)   SpO2 99%   BMI 30.54 kg/m    GENERAL: Healthy, alert and no distress  EYES: Eyes grossly normal to inspection.    RESP: No audible wheeze, cough, or visible cyanosis.  No increased work of breathing.    SKIN: Visible skin clear. No significant rash, abnormal pigmentation or lesions.  NEURO: Mentation and speech appropriate for age.  PSYCH: Mentation appears normal, affect normal/bright, judgement and insight intact, normal speech and appearance well-groomed.      Sincerely,      Lore Abreu PA-C       "

## 2024-08-21 ENCOUNTER — TELEPHONE (OUTPATIENT)
Dept: SURGERY | Facility: CLINIC | Age: 49
End: 2024-08-21

## 2024-08-21 ENCOUNTER — OFFICE VISIT (OUTPATIENT)
Dept: SURGERY | Facility: CLINIC | Age: 49
End: 2024-08-21
Payer: COMMERCIAL

## 2024-08-21 VITALS
WEIGHT: 195 LBS | HEIGHT: 67 IN | RESPIRATION RATE: 16 BRPM | OXYGEN SATURATION: 99 % | HEART RATE: 87 BPM | BODY MASS INDEX: 30.61 KG/M2

## 2024-08-21 DIAGNOSIS — E66.811 CLASS 1 OBESITY WITH SERIOUS COMORBIDITY AND BODY MASS INDEX (BMI) OF 31.0 TO 31.9 IN ADULT, UNSPECIFIED OBESITY TYPE: Primary | ICD-10-CM

## 2024-08-21 DIAGNOSIS — E11.9 TYPE 2 DIABETES, HBA1C GOAL < 7% (H): ICD-10-CM

## 2024-08-21 PROCEDURE — 99213 OFFICE O/P EST LOW 20 MIN: CPT | Performed by: PHYSICIAN ASSISTANT

## 2024-08-21 RX ORDER — TIRZEPATIDE 5 MG/.5ML
5 INJECTION, SOLUTION SUBCUTANEOUS WEEKLY
Qty: 2 ML | Refills: 2 | Status: SHIPPED | OUTPATIENT
Start: 2024-08-21

## 2024-08-21 NOTE — PATIENT INSTRUCTIONS
Nice to talk with you today. Below is the plan discussed.-  Lore Abreu PA-C     Pt Instructions:  Labs ordered.  Please call [phone] to set up a lab appt to schedule/check those labs ordered from 5/10/24. Would recommend checking: hgA1c, BMP, and vitamin D at your primary care visit tomorrow. Let me know on [facility] what those results are or if you have the fax number for the lab there - we can fax the results.  Let's try to switch from Ozempic to Mounjaro. Continue Ozempic until you get Moujaro.   Start Mounjaro:  You'll start at 2.5 mg once weekly for 4 weeks.   If you're tolerating it well, increase to 5 mg once weekly thereafter until I see you again.    You can take over the counter famotidine (Pepcid) 20 mg once or twice daily as needed for nausea/heartburn.    For reference:   Our clinic fax number: [phone]    Nursing line number: [phone]    Goals:  Continue to Focus on healthy food choices - prioritizing protein and veggies in your meals. I recommend eating every 4-6 hours to reduce the risk of low blood sugars and try to minimize snacking between meals. Stay well hydrated though the day as well.  Great job with exercising - please continue to invest in yourself with regular exercise. Continue to strive for a range for 150-300 minutes of exercise for weight maintenance and incorporating strength training twice a week to help preserve muscle!      Follow up:    Call [phone] to schedule next visit in next available. Be in touch on [facility] for refills/concerns between visits    Mounjaro (Tirzepatide)    Mounjaro (Tirzepatide): is an injectable prescription medicine for adults with type 2 diabetes used along with diet and exercise to improve blood sugar (glucose) and help with weight loss.    It is given as a shot once a week. It activates the body's receptors for GIP (glucose-dependent insulinotropic polypeptide) and GLP-1 (glucagon-like peptide-1) receptor, two naturally occurring  hormones that help tell the brain that you are full and lower blood sugar. It also works is by slowing down the rate that food leaves your stomach. You feel hoyt and will eat less.     Dosin.5 mg injected subcutaneously once weekly for 4 weeks   5 mg injected subcutaneously once weekly for 4 weeks  If needed can increase the dosage in 2.5 mg increments every 4 weeks up to 15 mg.    How to Inject:   For Video: [email or web address]-to-use-mounjaro    For Instructional Sheet: [email or web address]/mounjaro/mounjaro.html#ug0     Side effects: Patients are advised to eat more slowly and purposefully. Give yourself less portions. You may find after starting this medication you have a new point of fullness. It is also important to stay adequately hydrated on the medication to reduce risks of some of these side effects. Many of these side effects (nausea, diarrhea, etc) occurred during dose escalation but did improve over time with continuing the medication. If side effects are unmanageable, reach out to your prescribing provider.     The risk of pancreatitis (inflammation of the pancreas) has been associated with this type of medication, but is very rare.  If you have had pancreatitis in the past, this medication may not be for you. Please let us know about any past history of pancreas problems. If you experience persistent severe abdominal pain (sometimes radiating to the back potentially accompanied by vomiting and have a fever), stop the medication and contact your provider immediately for assessment. They will do a blood test to check for pancreatitis.       How to Inject:   For Video: [email or web address]/how-to-use-mounjaro    For Instructional Sheet: [email or web address].html#ug0     Savings Card:  Here is access to the Savings Card. Please sign up.   [email or web address]    For any questions or concerns please send a [employer] message to our team or call  our weight management call center at [phone] during regular business hours.     There is a small chance you may have some low blood sugar after taking the medication.   The signs of low blood sugar are:  Weakness  Shaky   Hungry  Sweating  Confusion      See below for ways to treat low blood sugar without adding in lots of extra calories.      Treating Low Blood Sugar    If you have symptoms of low blood sugar (sweating, shaking, dizzy, confused) eat 15 grams of carbs and wait 15 minutes:    Glucose Tabs are best for sugars under 70 -  Dex4 or BD Glucose tablets are good, you will need to take 3-4 of these to equal 15 grams.     One small box of raisins  4 oz fruit juice box or   cup fruit juice  1 small apple  1 small banana    cup canned fruit in water    English muffin or a slice of bread with jelly   1 low fat frozen waffle with sugar-free syrup    cup cottage cheese with   cup frozen or fresh blueberries  1 cup skim or low-fat milk    cup whole grain cereal  4-6 crackers such as Triscuits      This medication is usually not covered by insurance and can be quite expensive. Sometimes a prior authorization is required, which may take up to 1-2 weeks for an insurance company to make a decision if they will cover the medication. Please be patient, you will be notified after a decision has been made.    Contact the nurse via [employer] or call [phone] if you have any questions or concerns. (Do not stop taking it if you don't think it's working. For some people it works without them knowing it.)     In order to get refills of this or any medication we prescribe you must be seen in the medical weight mgmt clinic every 2-4 months.    Using Your Mounjaro/Zepbound Pen  Medicine (tirzepatide)    Storing your pens  Store your pens in the refrigerator until you are ready to use them. Don't let them freeze.  Your pen may be stored at room temperature for 21 days or fewer. Just make sure the temperature doesn't get  higher than 86 or lower than 46 degrees Fahrenheit.   Protect the pens from light.  Never use any pens that have .    Check your pen before use:  The liquid in the pen window should be clear or light yellow. Bubbles are okay to see.   Do not use the pen if you can see the window contains any specks, is cloudy, or has changed color.  Make sure you have the medication and dose your health care provider prescribed.    Getting ready to inject:   1. Wash and dry your hands well.  2. Make sure the counter you use to place your supplies on is clean.  3. Make sure your injection time will not be interrupted by children or pets.  4. Have alcohol wipes or alcohol and cotton balls available to clean the injection site.   5. Choose your injection site. It can be on your stomach, back of upper arms, or upper legs. Remember to change your injection site each time you inject. Try to be at least 1 inch away from the previous one. Stay at least 1 inch away from your belly button.       Inject your dose:  1. Each pen is prefilled with one dose of medicine.    2. Pull off the grey cap at the bottom of the pen. Throw it in the trash. Do not put the cap back on the pen.   3. Make sure your pen is in the locked position. You will find the lock at the top of the pen.   4. Clean the injection site with alcohol.   5. Place the clear part of the pen against your skin. Unlock the pen by turning it to the unlock  position at the top.   6. Press and hold the purple injection button at the top of the pen. Listen for 2 clicks. The last click tells you the injection has been completed.   7. This injection is given 1 day a week. If you need to change the day you inject, there needs to be at least 3 days or 72 hours between the two doses.  8. If you miss a dose, you may take the dose within 4 days or 96 hours of the missed dose.   9. Your injection may be best tolerated if given at night.     Disposing of your pens:  Dispose of your pens in a  puncture-resistant container (hard plastic bottle) or Sharps container.  Check with the county you live in on how to dispose of the container.  Do not recycle the container with used pens.       Of note, you may not be able to  your medication right away. It may require a prior authorization from your insurance company. This may take a week or more.

## 2024-08-21 NOTE — ASSESSMENT & PLAN NOTE
Healthy habits to assist with further weight loss were discussed. Raf doesn't feel she's had much benefit thus far with Ozempic and does have some side effects after starting so we will pivot and try for Mounjaro to see if better tolerated/more effective. She will get labs from primary care tomorrow as well as vitals/blood pressure. Declined BP today - briefly considered getting checked but then felt anxious so decided not to stay for it to be checked. Mao really only gets BP checks w/primary care and has an apt tomorrow

## 2024-09-14 ENCOUNTER — HEALTH MAINTENANCE LETTER (OUTPATIENT)
Age: 49
End: 2024-09-14

## 2024-11-23 ENCOUNTER — HEALTH MAINTENANCE LETTER (OUTPATIENT)
Age: 49
End: 2024-11-23

## 2024-12-26 ENCOUNTER — TELEPHONE (OUTPATIENT)
Dept: SURGERY | Facility: CLINIC | Age: 49
End: 2024-12-26
Payer: COMMERCIAL

## 2024-12-26 NOTE — TELEPHONE ENCOUNTER
Pt called once and now sent  msg X 2.  Has not read msg sent 12/19 that needs to reschedule.  3 attempts.  Will cx appt for Joao Mahoney, MS, RD, RN

## 2025-01-05 DIAGNOSIS — E66.811 CLASS 1 OBESITY WITH SERIOUS COMORBIDITY AND BODY MASS INDEX (BMI) OF 31.0 TO 31.9 IN ADULT, UNSPECIFIED OBESITY TYPE: ICD-10-CM

## 2025-01-05 DIAGNOSIS — E11.9 TYPE 2 DIABETES, HBA1C GOAL < 7% (H): ICD-10-CM

## 2025-01-06 RX ORDER — TIRZEPATIDE 5 MG/.5ML
INJECTION, SOLUTION SUBCUTANEOUS
Qty: 2 ML | Refills: 0 | Status: SHIPPED | OUTPATIENT
Start: 2025-01-06

## 2025-01-06 NOTE — TELEPHONE ENCOUNTER
Per pt msg today: Hi there! I have been taking it and feel it helps with no real side effects. I did my last pen, last week and was suppose to do one last night! I forgot I was out and thought I just needed to refill. Then I saw I had no refills left.     So, yes I would love a refill b/c I am out and my appt with Lore isn t until the end of the month. Thank you!   Will refill for 1 month per clinic protocol.  Adela Mahoney, MS, RD, RN

## 2025-01-31 ENCOUNTER — TELEPHONE (OUTPATIENT)
Dept: SURGERY | Facility: CLINIC | Age: 50
End: 2025-01-31

## 2025-01-31 DIAGNOSIS — E11.9 TYPE 2 DIABETES, HBA1C GOAL < 7% (H): Primary | ICD-10-CM

## 2025-01-31 PROBLEM — E66.3 OVERWEIGHT (BMI 25.0-29.9): Status: ACTIVE | Noted: 2025-01-31

## 2025-01-31 PROBLEM — Z86.39 HX OF OBESITY: Status: ACTIVE | Noted: 2025-01-31

## 2025-01-31 RX ORDER — TIRZEPATIDE 7.5 MG/.5ML
7.5 INJECTION, SOLUTION SUBCUTANEOUS
Qty: 2 ML | Refills: 5 | Status: SHIPPED | OUTPATIENT
Start: 2025-01-31 | End: 2025-06-03

## 2025-01-31 NOTE — TELEPHONE ENCOUNTER
Please reorder tirzepatide-Weight Management (ZEPBOUND) 7.5 MG/0.5ML prefilled pen as Mounjaro since patient is diabetic. This will make coverage of the medication easier, since there is already an approved PA on file.    Thanks, Elizabeth

## 2025-03-08 ENCOUNTER — HEALTH MAINTENANCE LETTER (OUTPATIENT)
Age: 50
End: 2025-03-08

## 2025-06-22 ENCOUNTER — HEALTH MAINTENANCE LETTER (OUTPATIENT)
Age: 50
End: 2025-06-22

## 2025-07-09 NOTE — PROGRESS NOTES
7/10/2025      Return Medical Weight Management Note     Madeleine Jackson  MRN:  [id number]  :  1975    Assessment & Plan   Problem List Items Addressed This Visit       Type 2 diabetes, HbA1c goal < 7% (H)    [place] benefit for Mounjaro for sugar control and weight management.  This is a weight related comorbidity that I would anticipate to improve with weight management.           Relevant Medications    tirzepatide (MOUNJARO) 10 MG/0.5ML SOAJ auto-injector pen    Hx of obesity - Primary    Patient was congratulated on wt loss success thus far. Healthy habits to assist with further weight loss were discussed. madeleine will continue Mounjaro for dual DM II and weight management. She will add pepcid for some noted GI side effects in the evenings - she did not feel this was low sugar related. I did offer to send glucometer (she no longer has one) to be able to check and also encouraged to work with PCP if this was occurring. She really did not feel it was low sugars and has also reduced metformin. She'll try adding pepcid and keep us posted with how it's going. We reviewed recommendations for muscle conservation including eating three meals daily, good protein intake, and strength training.          Relevant Medications    tirzepatide (MOUNJARO) 10 MG/0.5ML SOAJ auto-injector pen    famotidine (PEPCID) 20 MG tablet    Overweight (BMI 25.0-29.9)    Relevant Medications    tirzepatide (MOUNJARO) 10 MG/0.5ML SOAJ auto-injector pen     Other Visit Diagnoses         Nausea        Relevant Medications    famotidine (PEPCID) 20 MG tablet             AOM Considerations:  Phentermine: SSRI consideration with Lexapro Anxiety:                  Yes  Topiramate: CNS depressant with Lexapro     GLP-1:             Monitor sugars, history of type 2 diabetes -utilizing Mounjaro for dual benefit of type 2 diabetes and weight management  Naltrexone: No interaction seen           Wellbutrin: Endometrial response to Lexapro  Anxiety:                      Yes        Metformin: using right now - has stomach issues.       Contrave:  Qsymia:      PATIENT INSTRUCTIONS:  Pt Instructions:  Can talk with primary care as well about hair loss. Can ask her about screening TSH (thyroid test) or iron studies to look into this more. I'm happy to order as well if preferred. Hair loss handout also below and focus on good protein still and three meals daily! Certainly can be weight change related with weight loss.   Continue 10 mg Mounjaro weekly and be in touch as needed between visits for dose changes.  Add Pepcid (famotidine) 20 mg once to twice daily for GI upset/nausea.     Goals:  Focus on healthy food choices - prioritizing protein and veggies in your meals. I recommend eating every 4-6 hours to reduce the risk of low blood sugars and try to minimize snacking between meals. Stay well hydrated though the day as well.  Great job with exercising - please continue to invest in yourself with regular exercise. Continue to strive for a range for 150-300 minutes of exercise for weight maintenance and incorporating strength training twice-three times a week to help preserve muscle!      Follow up:    Call [phone] to schedule next visit in next available in 3 months. Be in touch on Mychart for refills/concerns between visits    34 minutes spent on the date of the encounter doing chart review, history and exam, result review, counseling, developing plan of care, documentation, and further activities as noted      The longitudinal plan of care for the diagnosis(es)/condition(s) as documented were addressed during this visit. Due to the added complexity in care, I will continue to support madeleine in the subsequent management and with ongoing continuity of care.      INTERVAL HISTORY:  madeleine returns for medical weight management follow up.  Last seen on 1/31/2025 and plan to continue Mounjaro titration    States hair loss in questionnaire - she  states this is more over the past couple months - unsure if menopause related or other causes. Agreeable to continuing medications. Stomach can be a little 'off' in the mornings when she's waking up in the mornings. She says it doesn't feel like low sugars. Can be in the middle of the night as well. Just stomach off more so than anything else and again doesn't feel like it's low sugars.     She's working on more protein but has felt she may be low on iron.    She increased to 10 mg Mounjaro last month hoping this will help with weight stall at 177 lb.    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 She does have some more freedom in terms of scheduling this summer and is much more active in general. She agrees she needs to add more strength training and is open to trying to incorporate this more intentionally. She may look into using her weights and maybe an carole such as [employer].      WEIGHT METRICS:  Body mass index is 27.91 kg/m .   Current Weight: 178 lb 3.2 oz (80.8 kg)  Last Visits Weight: 183 lb (83 kg)  Initial Weight (lbs): 200 lbs  Cumulative weight loss (lbs): 21.8  Weight Loss Percentage: 10.9%    Wt Readings from Last 10 Encounters:   07/10/25 178 lb 3.2 oz (80.8 kg)   01/31/25 183 lb (83 kg)   08/21/24 195 lb (88.5 kg)   05/10/24 200 lb (90.7 kg)   04/29/21 192 lb (87.1 kg)   06/10/20 198 lb (89.8 kg)   10/21/17 187 lb (84.8 kg)   02/27/13 205 lb (93 kg)                 7/10/2025     1:37 AM   Weight Loss Medication History Reviewed With Patient   Which weight loss medications are you currently taking on a regular basis? Metformin   Are you having any side effects from the weight loss medication that we have prescribed you? Yes   If you are having side effects please describe: Hair loss?           7/10/2025     1:37 AM   Changes and Difficulties   I have made the following changes to my diet since my last visit: More protein/ multi vitamin   With regards to my diet, I am still struggling  "with: Seeet tooth   I have made the following changes to my activity/exercise since my last visit: Daily walks   With regards to my activity/exercise, I am still struggling with: Strength training       LABS:  Hemoglobin A1C   Date Value Ref Range Status   04/29/2021 11.8 (A) 4.8 - 4.6 % Final      Outside labs posted to [facility] 6/2/2025  CMP: Creatinine 0.48, BUN/creatinine ratio 31 otherwise normal  Hemoglobin A1c 5.4  Lipid showing elevated triglycerides and HDL slightly low at 37    BP Readings from Last 6 Encounters:   07/10/25 (!) 142/93   01/31/25 (!) 158/105   07/29/21 130/85   04/29/21 (!) 140/85   06/10/20 (!) 165/112   10/21/17 137/89       Pulse Readings from Last 6 Encounters:   07/10/25 84   08/21/24 87   04/29/21 85   03/02/13 92       PE:  BP (!) 142/93   Pulse 84   Ht 5' 7\" (1.702 m)   Wt 178 lb 3.2 oz (80.8 kg)   SpO2 98%   BMI 27.91 kg/m    GENERAL: Healthy, alert and no distress  EYES: Eyes grossly normal to inspection.    RESP: No audible wheeze, cough, or visible cyanosis.  No increased work of breathing.    SKIN: Visible skin clear. No significant rash, abnormal pigmentation or lesions.  NEURO: Mentation and speech appropriate for age.  PSYCH: Mentation appears normal, affect normal/bright, judgement and insight intact, normal speech and appearance well-groomed.      Sincerely,      Lore Abreu PA-C         "

## 2025-07-10 ENCOUNTER — OFFICE VISIT (OUTPATIENT)
Dept: SURGERY | Facility: CLINIC | Age: 50
End: 2025-07-10
Payer: COMMERCIAL

## 2025-07-10 VITALS
HEIGHT: 67 IN | DIASTOLIC BLOOD PRESSURE: 93 MMHG | SYSTOLIC BLOOD PRESSURE: 142 MMHG | WEIGHT: 178.2 LBS | BODY MASS INDEX: 27.97 KG/M2 | HEART RATE: 84 BPM | OXYGEN SATURATION: 98 %

## 2025-07-10 DIAGNOSIS — Z86.39 HX OF OBESITY: Primary | ICD-10-CM

## 2025-07-10 DIAGNOSIS — E66.3 OVERWEIGHT (BMI 25.0-29.9): ICD-10-CM

## 2025-07-10 DIAGNOSIS — E11.9 TYPE 2 DIABETES, HBA1C GOAL < 7% (H): ICD-10-CM

## 2025-07-10 DIAGNOSIS — R11.0 NAUSEA: ICD-10-CM

## 2025-07-10 RX ORDER — FAMOTIDINE 20 MG/1
20 TABLET, FILM COATED ORAL 2 TIMES DAILY PRN
Qty: 60 TABLET | Refills: 3 | Status: SHIPPED | OUTPATIENT
Start: 2025-07-10

## 2025-07-10 NOTE — ASSESSMENT & PLAN NOTE
Patient was congratulated on wt loss success thus far. Healthy habits to assist with further weight loss were discussed. madeleine will continue Mounjaro for dual DM II and weight management. She will add pepcid for some noted GI side effects in the evenings - she did not feel this was low sugar related. I did offer to send glucometer (she no longer has one) to be able to check and also encouraged to work with PCP if this was occurring. She really did not feel it was low sugars and has also reduced metformin. She'll try adding pepcid and keep us posted with how it's going. We reviewed recommendations for muscle conservation including eating three meals daily, good protein intake, and strength training.

## 2025-07-10 NOTE — ASSESSMENT & PLAN NOTE
[place] benefit for Mounjaro for sugar control and weight management.  This is a weight related comorbidity that I would anticipate to improve with weight management.

## 2025-07-10 NOTE — PATIENT INSTRUCTIONS
Nice to talk with you today. Below is the plan discussed.-  Lore Abreu PA-C     Pt Instructions:  Can talk with primary care as well about hair loss. Can ask her about screening TSH (thyroid test) or iron studies to look into this more. I'm happy to order as well if preferred. Hair loss handout also below and focus on good protein still and three meals daily! Certainly can be weight change related with weight loss.   Continue 10 mg Mounjaro weekly and be in touch as needed between visits for dose changes.  Add Pepcid (famotidine) 20 mg once to twice daily for GI upset/nausea.     Goals:  Focus on healthy food choices - prioritizing protein and veggies in your meals. I recommend eating every 4-6 hours to reduce the risk of low blood sugars and try to minimize snacking between meals. Stay well hydrated though the day as well.  Great job with exercising - please continue to invest in yourself with regular exercise. Continue to strive for a range for 150-300 minutes of exercise for weight maintenance and incorporating strength training twice-three times a week to help preserve muscle!      Follow up:    Call [phone] to schedule next visit in next available in 3 months. Be in touch on Mychart for refills/concerns between visits       Telogen Effluvium (Hair Loss)  What Is It?  At any given time, about 85% to 90% of the hairs on the average person's head are actively growing (the anagen phase) and the others are resting (the telogen phase). Typically, a hair is in the anagen phase for two to four years, then enters the telogen phase, rests for about two to four months, and then falls out and is replaced by a new, growing hair. The average person naturally loses about 100 hairs a day.  In a person with telogen effluvium, some body change or shock pushes more hairs into the telogen phase. About 30% of the hairs stop growing and go into the resting phase before falling out. So you may lose an average of 300 hairs a  day instead of 100.  Telogen effluvium can be triggered by a number of different events, including:  Surgery (like wt. loss surgery)  Extreme weight loss  Extreme change in diet  Abrupt hormonal changes, including those associated with childbirth and menopause  Iron deficiency  Hypothyroidism or hyperthyroidism  Because hairs that enter the telogen phase rest in place for two to four months before falling out, you may not notice any hair loss until two to four months after the event that caused the problem. Telogen effluvium rarely lasts longer than six months.  Although losing a great number of hairs within a short time can be frightening, the condition is  temporary. Each hair pushed out is replaced by a new, growing hair. Because hair on the scalp grows slowly, your hair may feel or look thinner than usual for a time but fullness will return as the new hairs grow in.  Expected Duration  Typically, hair loss begins two to four months after the event that triggered the problem, and lasts approximately six months. New hairs begin growing immediately after the hair falls out, but significant growth may not be noticed for several months.  Prevention  Nothing can be done to prevent most of the types of physical shock that can start telogen effluvium.   Treatment  No treatment for active telogen effluvium has been proven effective.    Strength Training  Strength training is exercise that involves your own body weight or equipment to build muscle, endurance, and strength. Increasing muscle helps increase your metabolism.     [employer] for Strength training  https://www.fvfiles.[email or web address].pdf  Muscle Conditionin Exercises  Resistance Training with Free Weights: 3 Exercises  Resistance Training with Surgical Tubin Exercises  Seated Exercises for Arms and Legs: 11 Exercises  Stretchin Exercises    Additional Outside Sources  No Equipment Home Exercise Lists from [employer] [facility] [employer]    [email or web address]/public/upload/files/[email or web address]_[email or web address]  Strength training YouTube workouts   [email or web address]  Guide to Beginning Strength Training  https://www.[email or web address]/fitness/p60316098/beginners-guide-weight-training/

## 2025-07-15 ENCOUNTER — TELEPHONE (OUTPATIENT)
Dept: SURGERY | Facility: CLINIC | Age: 50
End: 2025-07-15
Payer: COMMERCIAL

## 2025-07-15 DIAGNOSIS — E66.3 OVERWEIGHT (BMI 25.0-29.9): ICD-10-CM

## 2025-07-15 DIAGNOSIS — Z86.39 HX OF OBESITY: ICD-10-CM

## 2025-07-15 DIAGNOSIS — E11.9 TYPE 2 DIABETES, HBA1C GOAL < 7% (H): Primary | ICD-10-CM

## 2025-07-15 NOTE — TELEPHONE ENCOUNTER
Called pharmacy and confirmed the correct dx code was being used - they confirmed DMT2 was being used.    Co-pay increase to > $1000/mo is likely due to deductible renewal after 7/1.    Called patient and explained why the reason for the sudden co-pay increase.    Patient understandably disappointed. Instructed her to call her member benefits to ask if there was a way to lower this co-pay or if any other medications would be covered at a cheaper co-pay such as Ozempic.    She will message the clinic with what she finds out.    Eleonora KLEIN RN, BSN

## 2025-07-15 NOTE — TELEPHONE ENCOUNTER
Medication Question or Refill    Contacts       Contact Date/Time Type Contact Phone/Fax    07/15/2025 09:47 AM CDT Phone (Incoming) Yolisjose manuelmadeleine rivera (Self) [phone] (M)            What medication are you calling about (include dose and sig)?: Mounjaro 10 mg    Preferred Pharmacy:   [place] [address] [place] [address]  [address] [facility] [address]  Phone: [phone] Fax: [phone]      Controlled Substance Agreement on file:   CSA -- Patient Level:    CSA: None found at the patient level.       Who prescribed the medication?: Lore Abreu    Do you need a refill?Yes    When did you use the medication last? 07/15/2025    Patient offered an appointment? No, Pt. Has an appointment scheduled.     Do you have any questions or concerns?  Yes: pt. States she went to the pharmacy to  her Mounjaro and it was $1,000.00 and is usually not paying anything because she has reached her deductible. Pt.'s deductible starts over on July 1st. Pt. Would like to know if the drug was entered in incorrectly because she takes it for Type 2 Diabetes and not for weight loss. Pt. Is leaving on a ten day vacation on Sunday and would like to have the matter resolved ASA.      Could we send this information to you in [facility] or would you prefer to receive a phone call?:   Patient would prefer a phone call   Okay to leave a detailed message?: Yes at Cell number on file:    Telephone Information:   Mobile [phone]